# Patient Record
Sex: MALE | Race: WHITE | Employment: OTHER | URBAN - METROPOLITAN AREA
[De-identification: names, ages, dates, MRNs, and addresses within clinical notes are randomized per-mention and may not be internally consistent; named-entity substitution may affect disease eponyms.]

---

## 2022-12-05 ENCOUNTER — HOSPITAL ENCOUNTER (INPATIENT)
Age: 52
LOS: 5 days | Discharge: HOME | DRG: 177 | End: 2022-12-10
Attending: EMERGENCY MEDICINE | Admitting: INTERNAL MEDICINE
Payer: OTHER GOVERNMENT

## 2022-12-05 ENCOUNTER — APPOINTMENT (OUTPATIENT)
Dept: GENERAL RADIOLOGY | Age: 52
DRG: 177 | End: 2022-12-05
Attending: EMERGENCY MEDICINE
Payer: OTHER GOVERNMENT

## 2022-12-05 DIAGNOSIS — N17.9 AKI (ACUTE KIDNEY INJURY) (HCC): ICD-10-CM

## 2022-12-05 DIAGNOSIS — U07.1 COVID-19 VIRUS INFECTION: ICD-10-CM

## 2022-12-05 DIAGNOSIS — E13.10 DIABETIC KETOACIDOSIS WITHOUT COMA ASSOCIATED WITH OTHER SPECIFIED DIABETES MELLITUS (HCC): Primary | ICD-10-CM

## 2022-12-05 PROBLEM — E11.10 DKA (DIABETIC KETOACIDOSIS) (HCC): Status: ACTIVE | Noted: 2022-12-05

## 2022-12-05 LAB
ADMINISTERED INITIALS, ADMINIT: NORMAL
ALBUMIN SERPL-MCNC: 4.4 G/DL (ref 3.5–5.2)
ALBUMIN/GLOB SERPL: 1.1 {RATIO} (ref 1.1–2.2)
ALP SERPL-CCNC: 80 U/L (ref 40–129)
ALT SERPL-CCNC: 12 U/L (ref 10–50)
ANION GAP SERPL CALC-SCNC: 37 MMOL/L (ref 5–15)
APPEARANCE UR: CLEAR
AST SERPL-CCNC: 21 U/L (ref 10–50)
BACTERIA URNS QL MICRO: NEGATIVE /HPF
BASE DEFICIT BLD-SCNC: 17.6 MMOL/L
BASOPHILS # BLD: 0 K/UL (ref 0–0.1)
BASOPHILS NFR BLD: 0 % (ref 0–1)
BILIRUB SERPL-MCNC: 0.3 MG/DL (ref 0.2–1)
BILIRUB UR QL CFM: NEGATIVE
BUN SERPL-MCNC: 61 MG/DL (ref 6–20)
BUN/CREAT SERPL: 32 (ref 12–20)
CALCIUM SERPL-MCNC: 9 MG/DL (ref 8.6–10)
CHLORIDE SERPL-SCNC: 84 MMOL/L (ref 98–107)
CO2 SERPL-SCNC: 9 MMOL/L (ref 22–29)
COLOR UR: ABNORMAL
COVID-19 RAPID TEST, COVR: DETECTED
CREAT SERPL-MCNC: 1.93 MG/DL (ref 0.7–1.2)
D50 ADMINISTERED, D50ADM: 0 ML
D50 ORDER, D50ORD: 0 ML
DIFFERENTIAL METHOD BLD: ABNORMAL
EOSINOPHIL # BLD: 0 K/UL (ref 0–0.4)
EOSINOPHIL NFR BLD: 0 % (ref 0–7)
EPITH CASTS URNS QL MICRO: ABNORMAL /LPF
ERYTHROCYTE [DISTWIDTH] IN BLOOD BY AUTOMATED COUNT: 12.1 % (ref 11.5–14.5)
FLUAV AG NPH QL IA: NEGATIVE
FLUBV AG NOSE QL IA: NEGATIVE
GLOBULIN SER CALC-MCNC: 4 G/DL (ref 2–4)
GLUCOSE BLD STRIP.AUTO-MCNC: 532 MG/DL (ref 65–117)
GLUCOSE BLD STRIP.AUTO-MCNC: >600 MG/DL (ref 65–117)
GLUCOSE SERPL-MCNC: 608 MG/DL (ref 65–100)
GLUCOSE UR STRIP.AUTO-MCNC: >1000 MG/DL
GLUCOSE, GLC: 532 MG/DL
HCO3 BLD-SCNC: 8.6 MMOL/L (ref 22–26)
HCT VFR BLD AUTO: 52.1 % (ref 36.6–50.3)
HGB BLD-MCNC: 17.8 G/DL (ref 12.1–17)
HGB UR QL STRIP: ABNORMAL
HIGH TARGET, HITG: 200 MG/DL
IMM GRANULOCYTES # BLD AUTO: 0.1 K/UL (ref 0–0.04)
IMM GRANULOCYTES NFR BLD AUTO: 1 % (ref 0–0.5)
INSULIN ADMINSTERED, INSADM: 9.4 UNITS/HOUR
INSULIN ORDER, INSORD: 9.4 UNITS/HOUR
KETONES UR QL STRIP.AUTO: >80 MG/DL
LACTATE BLD-SCNC: 2.93 MMOL/L (ref 0.4–2)
LEUKOCYTE ESTERASE UR QL STRIP.AUTO: NEGATIVE
LOW TARGET, LOT: 150 MG/DL
LYMPHOCYTES # BLD: 1 K/UL (ref 0.8–3.5)
LYMPHOCYTES NFR BLD: 8 % (ref 12–49)
MAGNESIUM SERPL-MCNC: 2.9 MG/DL (ref 1.6–2.6)
MCH RBC QN AUTO: 29.8 PG (ref 26–34)
MCHC RBC AUTO-ENTMCNC: 34.2 G/DL (ref 30–36.5)
MCV RBC AUTO: 87.3 FL (ref 80–99)
MINUTES UNTIL NEXT BG, NBG: 60 MIN
MONOCYTES # BLD: 1.5 K/UL (ref 0–1)
MONOCYTES NFR BLD: 12 % (ref 5–13)
MULTIPLIER, MUL: 0.02
NEUTS SEG # BLD: 10 K/UL (ref 1.8–8)
NEUTS SEG NFR BLD: 80 % (ref 32–75)
NITRITE UR QL STRIP.AUTO: NEGATIVE
NRBC # BLD: 0 K/UL (ref 0–0.01)
NRBC BLD-RTO: 0 PER 100 WBC
ORDER INITIALS, ORDINIT: NORMAL
PCO2 BLD: 22.9 MMHG (ref 35–45)
PH BLD: 7.18 [PH] (ref 7.35–7.45)
PH UR STRIP: 5 [PH] (ref 5–8)
PHOSPHATE SERPL-MCNC: 5.4 MG/DL (ref 2.5–4.5)
PLATELET # BLD AUTO: 233 K/UL (ref 150–400)
PMV BLD AUTO: 10.3 FL (ref 8.9–12.9)
PO2 BLD: 40 MMHG (ref 80–100)
POTASSIUM SERPL-SCNC: 4.3 MMOL/L (ref 3.5–5.1)
PROT SERPL-MCNC: 8.4 G/DL (ref 6.4–8.3)
PROT UR STRIP-MCNC: ABNORMAL MG/DL
RBC # BLD AUTO: 5.97 M/UL (ref 4.1–5.7)
RBC #/AREA URNS HPF: ABNORMAL /HPF (ref 0–5)
SAO2 % BLD: 63.9 % (ref 92–97)
SERVICE CMNT-IMP: ABNORMAL
SODIUM SERPL-SCNC: 130 MMOL/L (ref 136–145)
SOURCE, COVRS: ABNORMAL
SP GR UR REFRACTOMETRY: 1.02 (ref 1–1.03)
SPECIMEN TYPE: ABNORMAL
UR CULT HOLD, URHOLD: NORMAL
UROBILINOGEN UR QL STRIP.AUTO: 0.2 EU/DL (ref 0.2–1)
WBC # BLD AUTO: 12.6 K/UL (ref 4.1–11.1)
WBC URNS QL MICRO: ABNORMAL /HPF (ref 0–4)

## 2022-12-05 PROCEDURE — 87635 SARS-COV-2 COVID-19 AMP PRB: CPT

## 2022-12-05 PROCEDURE — 65270000029 HC RM PRIVATE

## 2022-12-05 PROCEDURE — 74011000258 HC RX REV CODE- 258: Performed by: EMERGENCY MEDICINE

## 2022-12-05 PROCEDURE — 96360 HYDRATION IV INFUSION INIT: CPT

## 2022-12-05 PROCEDURE — 74011250636 HC RX REV CODE- 250/636: Performed by: EMERGENCY MEDICINE

## 2022-12-05 PROCEDURE — 71045 X-RAY EXAM CHEST 1 VIEW: CPT

## 2022-12-05 PROCEDURE — 84100 ASSAY OF PHOSPHORUS: CPT

## 2022-12-05 PROCEDURE — 81001 URINALYSIS AUTO W/SCOPE: CPT

## 2022-12-05 PROCEDURE — 80053 COMPREHEN METABOLIC PANEL: CPT

## 2022-12-05 PROCEDURE — 93005 ELECTROCARDIOGRAM TRACING: CPT

## 2022-12-05 PROCEDURE — 36415 COLL VENOUS BLD VENIPUNCTURE: CPT

## 2022-12-05 PROCEDURE — 99285 EMERGENCY DEPT VISIT HI MDM: CPT

## 2022-12-05 PROCEDURE — 87804 INFLUENZA ASSAY W/OPTIC: CPT

## 2022-12-05 PROCEDURE — 87040 BLOOD CULTURE FOR BACTERIA: CPT

## 2022-12-05 PROCEDURE — 85025 COMPLETE CBC W/AUTO DIFF WBC: CPT

## 2022-12-05 PROCEDURE — 83036 HEMOGLOBIN GLYCOSYLATED A1C: CPT

## 2022-12-05 PROCEDURE — 83735 ASSAY OF MAGNESIUM: CPT

## 2022-12-05 PROCEDURE — 74011636637 HC RX REV CODE- 636/637: Performed by: EMERGENCY MEDICINE

## 2022-12-05 RX ORDER — DEXTROSE MONOHYDRATE 100 MG/ML
0-250 INJECTION, SOLUTION INTRAVENOUS AS NEEDED
Status: DISCONTINUED | OUTPATIENT
Start: 2022-12-05 | End: 2022-12-07

## 2022-12-05 RX ORDER — MAGNESIUM SULFATE 100 %
4 CRYSTALS MISCELLANEOUS AS NEEDED
Status: DISCONTINUED | OUTPATIENT
Start: 2022-12-05 | End: 2022-12-07

## 2022-12-05 RX ORDER — METFORMIN HYDROCHLORIDE 1000 MG/1
1000 TABLET ORAL 2 TIMES DAILY WITH MEALS
Status: ON HOLD | COMMUNITY
End: 2022-12-07

## 2022-12-05 RX ORDER — POLYETHYLENE GLYCOL 3350 17 G/17G
17 POWDER, FOR SOLUTION ORAL DAILY PRN
Status: DISCONTINUED | OUTPATIENT
Start: 2022-12-05 | End: 2022-12-10 | Stop reason: HOSPADM

## 2022-12-05 RX ORDER — ACETAMINOPHEN 325 MG/1
650 TABLET ORAL
Status: DISCONTINUED | OUTPATIENT
Start: 2022-12-05 | End: 2022-12-10 | Stop reason: HOSPADM

## 2022-12-05 RX ORDER — SODIUM CHLORIDE 0.9 % (FLUSH) 0.9 %
5-40 SYRINGE (ML) INJECTION AS NEEDED
Status: DISCONTINUED | OUTPATIENT
Start: 2022-12-05 | End: 2022-12-10 | Stop reason: HOSPADM

## 2022-12-05 RX ORDER — SODIUM BICARBONATE 1 MEQ/ML
100 SYRINGE (ML) INTRAVENOUS ONCE
Status: DISCONTINUED | OUTPATIENT
Start: 2022-12-05 | End: 2022-12-06

## 2022-12-05 RX ORDER — SODIUM CHLORIDE 0.9 % (FLUSH) 0.9 %
5-40 SYRINGE (ML) INJECTION EVERY 8 HOURS
Status: DISCONTINUED | OUTPATIENT
Start: 2022-12-05 | End: 2022-12-10 | Stop reason: HOSPADM

## 2022-12-05 RX ORDER — ACETAMINOPHEN 650 MG/1
650 SUPPOSITORY RECTAL
Status: DISCONTINUED | OUTPATIENT
Start: 2022-12-05 | End: 2022-12-10 | Stop reason: HOSPADM

## 2022-12-05 RX ORDER — ONDANSETRON 2 MG/ML
4 INJECTION INTRAMUSCULAR; INTRAVENOUS
Status: DISCONTINUED | OUTPATIENT
Start: 2022-12-05 | End: 2022-12-10 | Stop reason: HOSPADM

## 2022-12-05 RX ORDER — SODIUM CHLORIDE 0.9 % (FLUSH) 0.9 %
5-10 SYRINGE (ML) INJECTION AS NEEDED
Status: DISCONTINUED | OUTPATIENT
Start: 2022-12-05 | End: 2022-12-10 | Stop reason: HOSPADM

## 2022-12-05 RX ORDER — ENOXAPARIN SODIUM 100 MG/ML
40 INJECTION SUBCUTANEOUS DAILY
Status: DISCONTINUED | OUTPATIENT
Start: 2022-12-06 | End: 2022-12-10 | Stop reason: HOSPADM

## 2022-12-05 RX ORDER — POTASSIUM CHLORIDE 7.45 MG/ML
10 INJECTION INTRAVENOUS
Status: DISCONTINUED | OUTPATIENT
Start: 2022-12-05 | End: 2022-12-06

## 2022-12-05 RX ORDER — SODIUM CHLORIDE, SODIUM LACTATE, POTASSIUM CHLORIDE, CALCIUM CHLORIDE 600; 310; 30; 20 MG/100ML; MG/100ML; MG/100ML; MG/100ML
200 INJECTION, SOLUTION INTRAVENOUS CONTINUOUS
Status: DISCONTINUED | OUTPATIENT
Start: 2022-12-05 | End: 2022-12-06

## 2022-12-05 RX ORDER — INSULIN LISPRO 100 [IU]/ML
INJECTION, SOLUTION INTRAVENOUS; SUBCUTANEOUS
Status: DISCONTINUED | OUTPATIENT
Start: 2022-12-06 | End: 2022-12-07

## 2022-12-05 RX ORDER — ONDANSETRON 4 MG/1
4 TABLET, ORALLY DISINTEGRATING ORAL
Status: DISCONTINUED | OUTPATIENT
Start: 2022-12-05 | End: 2022-12-10 | Stop reason: HOSPADM

## 2022-12-05 RX ADMIN — SODIUM CHLORIDE, POTASSIUM CHLORIDE, SODIUM LACTATE AND CALCIUM CHLORIDE 1000 ML: 600; 310; 30; 20 INJECTION, SOLUTION INTRAVENOUS at 22:23

## 2022-12-05 RX ADMIN — SODIUM CHLORIDE, POTASSIUM CHLORIDE, SODIUM LACTATE AND CALCIUM CHLORIDE 1000 ML: 600; 310; 30; 20 INJECTION, SOLUTION INTRAVENOUS at 21:14

## 2022-12-05 RX ADMIN — POTASSIUM CHLORIDE 10 MEQ: 7.46 INJECTION, SOLUTION INTRAVENOUS at 23:10

## 2022-12-05 RX ADMIN — Medication 9.4 UNITS/HR: at 22:53

## 2022-12-05 RX ADMIN — SODIUM CHLORIDE, POTASSIUM CHLORIDE, SODIUM LACTATE AND CALCIUM CHLORIDE 760 ML: 600; 310; 30; 20 INJECTION, SOLUTION INTRAVENOUS at 23:09

## 2022-12-06 LAB
ADMINISTERED INITIALS, ADMINIT: AC
ADMINISTERED INITIALS, ADMINIT: NORMAL
ALBUMIN SERPL-MCNC: 3.2 G/DL (ref 3.5–5)
ALBUMIN/GLOB SERPL: 0.8 {RATIO} (ref 1.1–2.2)
ALP SERPL-CCNC: 58 U/L (ref 45–117)
ALT SERPL-CCNC: 17 U/L (ref 12–78)
ANION GAP SERPL CALC-SCNC: 12 MMOL/L (ref 5–15)
ANION GAP SERPL CALC-SCNC: 14 MMOL/L (ref 5–15)
ANION GAP SERPL CALC-SCNC: 16 MMOL/L (ref 5–15)
ANION GAP SERPL CALC-SCNC: 17 MMOL/L (ref 5–15)
AST SERPL-CCNC: 19 U/L (ref 15–37)
BASOPHILS # BLD: 0 K/UL (ref 0–0.1)
BASOPHILS NFR BLD: 0 % (ref 0–1)
BILIRUB SERPL-MCNC: 0.4 MG/DL (ref 0.2–1)
BUN SERPL-MCNC: 31 MG/DL (ref 6–20)
BUN SERPL-MCNC: 34 MG/DL (ref 6–20)
BUN SERPL-MCNC: 39 MG/DL (ref 6–20)
BUN SERPL-MCNC: 46 MG/DL (ref 6–20)
BUN/CREAT SERPL: 29 (ref 12–20)
BUN/CREAT SERPL: 30 (ref 12–20)
BUN/CREAT SERPL: 31 (ref 12–20)
BUN/CREAT SERPL: 36 (ref 12–20)
CALCIUM SERPL-MCNC: 7.8 MG/DL (ref 8.5–10.1)
CALCIUM SERPL-MCNC: 8.3 MG/DL (ref 8.5–10.1)
CALCIUM SERPL-MCNC: 8.7 MG/DL (ref 8.5–10.1)
CALCIUM SERPL-MCNC: 8.7 MG/DL (ref 8.5–10.1)
CHLORIDE SERPL-SCNC: 105 MMOL/L (ref 97–108)
CHLORIDE SERPL-SCNC: 108 MMOL/L (ref 97–108)
CHLORIDE SERPL-SCNC: 111 MMOL/L (ref 97–108)
CHLORIDE SERPL-SCNC: 112 MMOL/L (ref 97–108)
CO2 SERPL-SCNC: 15 MMOL/L (ref 21–32)
CO2 SERPL-SCNC: 15 MMOL/L (ref 21–32)
CO2 SERPL-SCNC: 17 MMOL/L (ref 21–32)
CO2 SERPL-SCNC: 18 MMOL/L (ref 21–32)
COMMENT, HOLDF: NORMAL
CREAT SERPL-MCNC: 0.87 MG/DL (ref 0.7–1.3)
CREAT SERPL-MCNC: 1.11 MG/DL (ref 0.7–1.3)
CREAT SERPL-MCNC: 1.28 MG/DL (ref 0.7–1.3)
CREAT SERPL-MCNC: 1.6 MG/DL (ref 0.7–1.3)
D50 ADMINISTERED, D50ADM: 0 ML
D50 ORDER, D50ORD: 0 ML
DIFFERENTIAL METHOD BLD: ABNORMAL
EOSINOPHIL # BLD: 0 K/UL (ref 0–0.4)
EOSINOPHIL NFR BLD: 0 % (ref 0–7)
ERYTHROCYTE [DISTWIDTH] IN BLOOD BY AUTOMATED COUNT: 12.1 % (ref 11.5–14.5)
EST. AVERAGE GLUCOSE BLD GHB EST-MCNC: 237 MG/DL
GLOBULIN SER CALC-MCNC: 3.9 G/DL (ref 2–4)
GLUCOSE BLD STRIP.AUTO-MCNC: 122 MG/DL (ref 65–117)
GLUCOSE BLD STRIP.AUTO-MCNC: 125 MG/DL (ref 65–117)
GLUCOSE BLD STRIP.AUTO-MCNC: 140 MG/DL (ref 65–117)
GLUCOSE BLD STRIP.AUTO-MCNC: 150 MG/DL (ref 65–117)
GLUCOSE BLD STRIP.AUTO-MCNC: 150 MG/DL (ref 65–117)
GLUCOSE BLD STRIP.AUTO-MCNC: 156 MG/DL (ref 65–117)
GLUCOSE BLD STRIP.AUTO-MCNC: 157 MG/DL (ref 65–117)
GLUCOSE BLD STRIP.AUTO-MCNC: 159 MG/DL (ref 65–117)
GLUCOSE BLD STRIP.AUTO-MCNC: 164 MG/DL (ref 65–117)
GLUCOSE BLD STRIP.AUTO-MCNC: 178 MG/DL (ref 65–117)
GLUCOSE BLD STRIP.AUTO-MCNC: 184 MG/DL (ref 65–117)
GLUCOSE BLD STRIP.AUTO-MCNC: 185 MG/DL (ref 65–117)
GLUCOSE BLD STRIP.AUTO-MCNC: 192 MG/DL (ref 65–117)
GLUCOSE BLD STRIP.AUTO-MCNC: 193 MG/DL (ref 65–117)
GLUCOSE BLD STRIP.AUTO-MCNC: 202 MG/DL (ref 65–117)
GLUCOSE BLD STRIP.AUTO-MCNC: 207 MG/DL (ref 65–117)
GLUCOSE BLD STRIP.AUTO-MCNC: 222 MG/DL (ref 65–117)
GLUCOSE BLD STRIP.AUTO-MCNC: 225 MG/DL (ref 65–117)
GLUCOSE BLD STRIP.AUTO-MCNC: 269 MG/DL (ref 65–117)
GLUCOSE BLD STRIP.AUTO-MCNC: 269 MG/DL (ref 65–117)
GLUCOSE BLD STRIP.AUTO-MCNC: 298 MG/DL (ref 65–117)
GLUCOSE BLD STRIP.AUTO-MCNC: 391 MG/DL (ref 65–117)
GLUCOSE SERPL-MCNC: 138 MG/DL (ref 65–100)
GLUCOSE SERPL-MCNC: 170 MG/DL (ref 65–100)
GLUCOSE SERPL-MCNC: 170 MG/DL (ref 65–100)
GLUCOSE SERPL-MCNC: 188 MG/DL (ref 65–100)
GLUCOSE, GLC: 122 MG/DL
GLUCOSE, GLC: 125 MG/DL
GLUCOSE, GLC: 140 MG/DL
GLUCOSE, GLC: 150 MG/DL
GLUCOSE, GLC: 150 MG/DL
GLUCOSE, GLC: 156 MG/DL
GLUCOSE, GLC: 157 MG/DL
GLUCOSE, GLC: 159 MG/DL
GLUCOSE, GLC: 164 MG/DL
GLUCOSE, GLC: 178 MG/DL
GLUCOSE, GLC: 184 MG/DL
GLUCOSE, GLC: 185 MG/DL
GLUCOSE, GLC: 192 MG/DL
GLUCOSE, GLC: 193 MG/DL
GLUCOSE, GLC: 202 MG/DL
GLUCOSE, GLC: 207 MG/DL
GLUCOSE, GLC: 222 MG/DL
GLUCOSE, GLC: 225 MG/DL
GLUCOSE, GLC: 269 MG/DL
GLUCOSE, GLC: 298 MG/DL
GLUCOSE, GLC: 391 MG/DL
HBA1C MFR BLD: 9.9 % (ref 4–5.6)
HCT VFR BLD AUTO: 39.3 % (ref 36.6–50.3)
HGB BLD-MCNC: 13.8 G/DL (ref 12.1–17)
HIGH TARGET, HITG: 200 MG/DL
IMM GRANULOCYTES # BLD AUTO: 0 K/UL (ref 0–0.04)
IMM GRANULOCYTES NFR BLD AUTO: 0 % (ref 0–0.5)
INSULIN ADMINSTERED, INSADM: 10.5 UNITS/HOUR
INSULIN ADMINSTERED, INSADM: 2.8 UNITS/HOUR
INSULIN ADMINSTERED, INSADM: 3.3 UNITS/HOUR
INSULIN ADMINSTERED, INSADM: 3.8 UNITS/HOUR
INSULIN ADMINSTERED, INSADM: 3.9 UNITS/HOUR
INSULIN ADMINSTERED, INSADM: 4.2 UNITS/HOUR
INSULIN ADMINSTERED, INSADM: 4.3 UNITS/HOUR
INSULIN ADMINSTERED, INSADM: 5.1 UNITS/HOUR
INSULIN ADMINSTERED, INSADM: 5.3 UNITS/HOUR
INSULIN ADMINSTERED, INSADM: 5.7 UNITS/HOUR
INSULIN ADMINSTERED, INSADM: 5.8 UNITS/HOUR
INSULIN ADMINSTERED, INSADM: 6.1 UNITS/HOUR
INSULIN ADMINSTERED, INSADM: 6.5 UNITS/HOUR
INSULIN ADMINSTERED, INSADM: 6.6 UNITS/HOUR
INSULIN ADMINSTERED, INSADM: 7.1 UNITS/HOUR
INSULIN ADMINSTERED, INSADM: 7.1 UNITS/HOUR
INSULIN ADMINSTERED, INSADM: 7.5 UNITS/HOUR
INSULIN ADMINSTERED, INSADM: 7.9 UNITS/HOUR
INSULIN ADMINSTERED, INSADM: 8.8 UNITS/HOUR
INSULIN ADMINSTERED, INSADM: 9.6 UNITS/HOUR
INSULIN ADMINSTERED, INSADM: 9.7 UNITS/HOUR
INSULIN ORDER, INSORD: 10.5 UNITS/HOUR
INSULIN ORDER, INSORD: 2.8 UNITS/HOUR
INSULIN ORDER, INSORD: 3.3 UNITS/HOUR
INSULIN ORDER, INSORD: 3.8 UNITS/HOUR
INSULIN ORDER, INSORD: 3.9 UNITS/HOUR
INSULIN ORDER, INSORD: 4.2 UNITS/HOUR
INSULIN ORDER, INSORD: 4.3 UNITS/HOUR
INSULIN ORDER, INSORD: 5.1 UNITS/HOUR
INSULIN ORDER, INSORD: 5.3 UNITS/HOUR
INSULIN ORDER, INSORD: 5.7 UNITS/HOUR
INSULIN ORDER, INSORD: 5.8 UNITS/HOUR
INSULIN ORDER, INSORD: 6.1 UNITS/HOUR
INSULIN ORDER, INSORD: 6.5 UNITS/HOUR
INSULIN ORDER, INSORD: 6.6 UNITS/HOUR
INSULIN ORDER, INSORD: 7.1 UNITS/HOUR
INSULIN ORDER, INSORD: 7.1 UNITS/HOUR
INSULIN ORDER, INSORD: 7.5 UNITS/HOUR
INSULIN ORDER, INSORD: 7.9 UNITS/HOUR
INSULIN ORDER, INSORD: 8.8 UNITS/HOUR
INSULIN ORDER, INSORD: 9.6 UNITS/HOUR
INSULIN ORDER, INSORD: 9.7 UNITS/HOUR
LACTATE BLD-SCNC: 2.58 MMOL/L (ref 0.4–2)
LOW TARGET, LOT: 150 MG/DL
LYMPHOCYTES # BLD: 0.7 K/UL (ref 0.8–3.5)
LYMPHOCYTES NFR BLD: 8 % (ref 12–49)
MAGNESIUM SERPL-MCNC: 2.1 MG/DL (ref 1.6–2.4)
MAGNESIUM SERPL-MCNC: 2.2 MG/DL (ref 1.6–2.4)
MAGNESIUM SERPL-MCNC: 2.4 MG/DL (ref 1.6–2.4)
MAGNESIUM SERPL-MCNC: 2.6 MG/DL (ref 1.6–2.4)
MCH RBC QN AUTO: 30.1 PG (ref 26–34)
MCHC RBC AUTO-ENTMCNC: 35.1 G/DL (ref 30–36.5)
MCV RBC AUTO: 85.6 FL (ref 80–99)
MINUTES UNTIL NEXT BG, NBG: 120 MIN
MINUTES UNTIL NEXT BG, NBG: 60 MIN
MONOCYTES # BLD: 0.9 K/UL (ref 0–1)
MONOCYTES NFR BLD: 11 % (ref 5–13)
MULTIPLIER, MUL: 0.02
MULTIPLIER, MUL: 0.03
MULTIPLIER, MUL: 0.04
MULTIPLIER, MUL: 0.05
MULTIPLIER, MUL: 0.06
MULTIPLIER, MUL: 0.07
NEUTS SEG # BLD: 6.7 K/UL (ref 1.8–8)
NEUTS SEG NFR BLD: 81 % (ref 32–75)
NRBC # BLD: 0 K/UL (ref 0–0.01)
NRBC BLD-RTO: 0 PER 100 WBC
ORDER INITIALS, ORDINIT: AC
ORDER INITIALS, ORDINIT: NORMAL
PHOSPHATE SERPL-MCNC: 1.1 MG/DL (ref 2.6–4.7)
PLATELET # BLD AUTO: 163 K/UL (ref 150–400)
PMV BLD AUTO: 10 FL (ref 8.9–12.9)
POTASSIUM SERPL-SCNC: 3.5 MMOL/L (ref 3.5–5.1)
POTASSIUM SERPL-SCNC: 3.5 MMOL/L (ref 3.5–5.1)
POTASSIUM SERPL-SCNC: 3.6 MMOL/L (ref 3.5–5.1)
POTASSIUM SERPL-SCNC: 5.1 MMOL/L (ref 3.5–5.1)
PROCALCITONIN SERPL-MCNC: 0.1 NG/ML
PROT SERPL-MCNC: 7.1 G/DL (ref 6.4–8.2)
RBC # BLD AUTO: 4.59 M/UL (ref 4.1–5.7)
RBC MORPH BLD: ABNORMAL
SAMPLES BEING HELD,HOLD: NORMAL
SERVICE CMNT-IMP: ABNORMAL
SODIUM SERPL-SCNC: 138 MMOL/L (ref 136–145)
SODIUM SERPL-SCNC: 140 MMOL/L (ref 136–145)
SODIUM SERPL-SCNC: 141 MMOL/L (ref 136–145)
SODIUM SERPL-SCNC: 141 MMOL/L (ref 136–145)
WBC # BLD AUTO: 8.3 K/UL (ref 4.1–11.1)

## 2022-12-06 PROCEDURE — 65610000006 HC RM INTENSIVE CARE

## 2022-12-06 PROCEDURE — 80053 COMPREHEN METABOLIC PANEL: CPT

## 2022-12-06 PROCEDURE — 36415 COLL VENOUS BLD VENIPUNCTURE: CPT

## 2022-12-06 PROCEDURE — 74011000258 HC RX REV CODE- 258: Performed by: EMERGENCY MEDICINE

## 2022-12-06 PROCEDURE — 77030037877 HC DRSG MEPILEX >48IN BORD MOLN -A

## 2022-12-06 PROCEDURE — 74011250636 HC RX REV CODE- 250/636: Performed by: EMERGENCY MEDICINE

## 2022-12-06 PROCEDURE — 2709999900 HC NON-CHARGEABLE SUPPLY

## 2022-12-06 PROCEDURE — 84145 PROCALCITONIN (PCT): CPT

## 2022-12-06 PROCEDURE — 80048 BASIC METABOLIC PNL TOTAL CA: CPT

## 2022-12-06 PROCEDURE — 93005 ELECTROCARDIOGRAM TRACING: CPT

## 2022-12-06 PROCEDURE — 74011250636 HC RX REV CODE- 250/636: Performed by: INTERNAL MEDICINE

## 2022-12-06 PROCEDURE — 74011000250 HC RX REV CODE- 250: Performed by: INTERNAL MEDICINE

## 2022-12-06 PROCEDURE — 82962 GLUCOSE BLOOD TEST: CPT

## 2022-12-06 PROCEDURE — 74011636637 HC RX REV CODE- 636/637: Performed by: EMERGENCY MEDICINE

## 2022-12-06 PROCEDURE — 99233 SBSQ HOSP IP/OBS HIGH 50: CPT

## 2022-12-06 PROCEDURE — 85025 COMPLETE CBC W/AUTO DIFF WBC: CPT

## 2022-12-06 PROCEDURE — 83735 ASSAY OF MAGNESIUM: CPT

## 2022-12-06 PROCEDURE — 84100 ASSAY OF PHOSPHORUS: CPT

## 2022-12-06 RX ORDER — DILTIAZEM HYDROCHLORIDE 5 MG/ML
10 INJECTION INTRAVENOUS ONCE
Status: COMPLETED | OUTPATIENT
Start: 2022-12-07 | End: 2022-12-06

## 2022-12-06 RX ORDER — DILTIAZEM HYDROCHLORIDE 5 MG/ML
20 INJECTION INTRAVENOUS ONCE
Status: COMPLETED | OUTPATIENT
Start: 2022-12-06 | End: 2022-12-06

## 2022-12-06 RX ORDER — DEXTROSE, SODIUM CHLORIDE, AND POTASSIUM CHLORIDE 5; .45; .15 G/100ML; G/100ML; G/100ML
125 INJECTION INTRAVENOUS CONTINUOUS
Status: DISCONTINUED | OUTPATIENT
Start: 2022-12-06 | End: 2022-12-07

## 2022-12-06 RX ADMIN — Medication 6.1 UNITS/HR: at 12:36

## 2022-12-06 RX ADMIN — DILTIAZEM HYDROCHLORIDE 20 MG: 5 INJECTION INTRAVENOUS at 22:52

## 2022-12-06 RX ADMIN — Medication 10 ML: at 21:08

## 2022-12-06 RX ADMIN — ENOXAPARIN SODIUM 40 MG: 100 INJECTION SUBCUTANEOUS at 11:17

## 2022-12-06 RX ADMIN — Medication 7.5 UNITS/HR: at 07:55

## 2022-12-06 RX ADMIN — SODIUM BICARBONATE: 84 INJECTION, SOLUTION INTRAVENOUS at 21:00

## 2022-12-06 RX ADMIN — SODIUM CHLORIDE, POTASSIUM CHLORIDE, SODIUM LACTATE AND CALCIUM CHLORIDE 200 ML/HR: 600; 310; 30; 20 INJECTION, SOLUTION INTRAVENOUS at 00:31

## 2022-12-06 RX ADMIN — POTASSIUM CHLORIDE 10 MEQ: 7.46 INJECTION, SOLUTION INTRAVENOUS at 01:37

## 2022-12-06 RX ADMIN — DEXTROSE MONOHYDRATE, SODIUM CHLORIDE, AND POTASSIUM CHLORIDE 125 ML/HR: 50; 4.5; 1.49 INJECTION, SOLUTION INTRAVENOUS at 02:39

## 2022-12-06 RX ADMIN — Medication 10 ML: at 05:38

## 2022-12-06 RX ADMIN — ONDANSETRON 4 MG: 2 INJECTION INTRAMUSCULAR; INTRAVENOUS at 22:18

## 2022-12-06 RX ADMIN — DEXTROSE MONOHYDRATE, SODIUM CHLORIDE, AND POTASSIUM CHLORIDE 125 ML/HR: 50; 4.5; 1.49 INJECTION, SOLUTION INTRAVENOUS at 20:22

## 2022-12-06 RX ADMIN — DEXTROSE MONOHYDRATE, SODIUM CHLORIDE, AND POTASSIUM CHLORIDE 125 ML/HR: 50; 4.5; 1.49 INJECTION, SOLUTION INTRAVENOUS at 11:17

## 2022-12-06 RX ADMIN — Medication 3.3 UNITS/HR: at 16:08

## 2022-12-06 RX ADMIN — DILTIAZEM HYDROCHLORIDE 10 MG: 5 INJECTION INTRAVENOUS at 23:45

## 2022-12-06 RX ADMIN — POTASSIUM PHOSPHATE, MONOBASIC POTASSIUM PHOSPHATE, DIBASIC: 224; 236 INJECTION, SOLUTION, CONCENTRATE INTRAVENOUS at 13:57

## 2022-12-06 NOTE — ED TRIAGE NOTES
Pt reports feeling unwell for 2-3 days, developed cough today, has not been taking his insulin due to illness, blood sugar too high to read.  Denies chest pain, sob,

## 2022-12-06 NOTE — DIABETES MGMT
[address] [facility]    CLINICAL NURSE SPECIALIST CONSULT     Initial Presentation   Ericak Sabillon is a 46 y.o. male admitted on 12/05/22 after experiencing hyperglycemia and extreme fatigue, as well as some nausea. LAB: , AG 37, CO2 9, eGFR 41, Na 130, WBC 12.6, positive urine ketones  Chest Xray:IMPRESSION  Mild basilar atelectasis. Covid positive       HX:   Past Medical History:   Diagnosis Date    Diabetes ([facility] [address]     Hyperlipidemia     Hypertension         INITIAL DX:   DKA (diabetic ketoacidosis) ([facility] [place] [address] [E11.10]     Current Treatment     TX: IVF, Julious Radon, DVT prophylaxis    Consulted by Provider for advanced diabetes nursing assessment and care for:   [x] Transitioning off Julious Radon   [x] Inpatient management strategy  [x] Home management assessment  [] Survival skill education    Hospital Course   Clinical progress has been complicated by DKA. Diabetes History   Per chart encounter notes, patient has 25 year history of diabetes. Diabetes-related Medical History  Acute complications  DKA  Neurological complications  Microvascular disease  Retinopathy/ ARABELLA  Macrovascular disease  CAD, TIAs    Diabetes Medication History  Key Antihyperglycemic Medications               metFORMIN (GLUCOPHAGE) 1,000 mg tablet (Taking) Take 1,000 mg by mouth two (2) times daily (with meals). Diabetes self-management practices: Patient lethargic and has been confused, according to nurse. Eating pattern   Deferred at this time  Physical activity pattern   Deferred  Monitoring pattern   Deferred at this time  Taking medications pattern  Deferred at this time.  Reported by nurse that he was not taking his insulin before coming in due to feeling sick  Social determinants of health impacting diabetes self-management practices   Concerned that you need to know more about how to stay healthy with diabetes  Overall evaluation:    [x] Achieving A1c target with drug therapy & self-care practices: Awaiting A1c result    Subjective   Sleeping in bed - Covid isolation in ICU     Objective   Physical exam  General Obese male  ill-appearing. Conversant and cooperative  Neuro  Alert, oriented   Vital Signs Visit Vitals  BP (!) 164/94 (BP 1 Location: Right upper arm, BP Patient Position: At rest)   Pulse (!) 108   Temp 99.6 °F (37.6 °C)   Resp 28   Wt 93.6 kg (206 lb 5.6 oz)   SpO2 94%     Skin  Warm and dry.   Extremities No foot wounds    Diabetic foot exam:    Deferred    Laboratory  Recent Labs     12/06/22  0525 12/05/22  2106   * 608*   AGAP 16* 37*   WBC  --  12.6*   CREA 1.60* 1.93*   AST  --  21   ALT  --  12       Factors impacting BG management  Factor Dose Comments   Nutrition:  Standard meals     NPO per DKA protocol    Other:   Kidney function  Liver function  Hyperlipidemia   ARABELLA  Normal      Blood glucose pattern      Significant diabetes-related events over the past 24-72 hours  Admission   Remains on glucostabilizer - no AG closure yet    Assessment and Plan   Nursing Diagnosis Risk for unstable blood glucose pattern   Nursing Intervention Domain 5250 Decision-making Support   Nursing Interventions Examined current inpatient diabetes/blood glucose control   Explored factors facilitating and impeding inpatient management  Explored corrective strategies with patient and responsible inpatient provider   Informed patient of rational for insulin strategy while hospitalized     Nursing Diagnosis 95298 Ineffective Health Management   Nursing Intervention Domain 5250 Decision-makingSupport   Nursing Interventions Identified diabetes self-management practices impeding diabetes control  Discussed diabetes survival skills related to  Healthy Plate eating plan; given handouts  Role of physical activity in improving insulin sensitivity and action  Procedure for blood glucose monitoring & options for low-cost products available from [facility]   Medications plan at discharge Evaluation   This 46 y.o. male was admitted in DKA and is positive for Covid. Per chart, he reportedly is Type 1.5 and has had diabetes for about 25 years. Patient is sleepy and has been somewhat agitated and confused this morning in the ICU, so I was unable to interview him. It was reported by nurse that because he was feeling sick at home, he stopped taking his insulin, resulting in DKA. Still awaiting on current A1c result and for AG closure. Given his obesity and Covid infection, would recommend starting him off with a 0.3 unit/kg/D dose to transition him off GS. This patient would benefit from diabetes self-management education and support [facility]) after discharge.     Recommendations   Continue with DKA protocol until AG closed for 2 consecutive lab draws    [x] Use of Subcutaneous Insulin Order set (7348)  Insulin Dosing Specific recommendation   Basal                                      (Based on weight, BMI & GFR) []        0.2 units/kg/D  [x] 0.3 units/kg/D  [] 0.4 units/kg/D 28 units Lantus daily   Nutritional                                      (Based on CHO/dextrose load) [] Normal sensitivity  [x] Insulin-resistant sensitivity Will add once patient eating regularly   Corrective                                       (Useful in adjusting insulin dosing) [] Normal sensitivity  [] HIGH sensitivity  [x] Insulin-resistant sensitivity        Billing Code(s)   [x] 60313 IP subsequent hospital care - 35 minutes [] 60236 Prolonged Services - 65 minutes [] 68193 Prolonged Services - 110 minutes  [] 22652 IP subsequent hospital care - 25 minutes [] 23399 Prolonged Services - 55 minutes [] 59536 Prolonged Services - 100 minutes  [] 00874 IP subsequent hospital care - 15 minutes [] 34689 Prolonged Services - 45 minutes [] 04161 Prolonged Services - 90 minutes    Before making these care recommendations, I personally reviewed the hospitalization record, including notes, laboratory & diagnostic data and current medications, and examined the patient at the bedside (circumstances permitting) before making care recommendations. More than fifty (50) percent of the time was spent in patient counseling and/or care coordination.   Total minutes: NAM [address], CNS  Diabetes Clinical Nurse Specialist  [facility]  Access via [facility]

## 2022-12-06 NOTE — PROGRESS NOTES
Reason for Admission:  DKA                     RUR Score:       10%              Plan for utilizing home health:      no needs identified @ this time    PCP: First and Last name:  None     Name of Practice: will need new PCP set up   Are you a current patient: Yes/No:    Approximate date of last visit:    Can you participate in a virtual visit with your PCP:                     Current Advanced Directive/Advance Care Plan: Full Code      Healthcare Decision Maker:            Rossy Dubose [phone]                  Transition of Care Plan:                Admitted with:  DKA  Covid +         Diabetes CNS consulted. When pt became ill @ home,pt stopped taking his insulin resulting in DKA. I am following pt for future discharge needs. At this time,there are no home health,rehab or other needs identified.     Kirit Keane

## 2022-12-06 NOTE — H&P
Hospitalist Admission Note    NAME:  Armaan Rg   :  1970   MRN:  [id number]     Date/Time:  2022 5:08 AM    Patient PCP: None  ________________________________________________________________________    Given the patient's current clinical presentation, I have a high level of concern for decompensation if discharged from the emergency department. Complex decision making was performed, which includes reviewing the patient's available past medical records, laboratory results, and x-ray films. My assessment of this patient's clinical condition and my plan of care is as follows. Assessment / Plan:    DKA:    The patient comes in w/ DKA    VS - , RR 32,   WBC 12.6, Hg 17.8  CO2 9, BUN 61, Cr 1.93    Admit and cont to monitor in ICU  Cont w/ insulin drip w/ BMP q4h and BG q1h  Once BG <250 start D5 1/2NS +K. Once AG closes and if he is able to tolerate orals we can then transition to subcutaneous insulin    Most recent   Cont w/ insulin drip  Cont w/ D5 1/2 NS + K and follow up repeat BMP to re-evaluate bicarb and AG    2.  COVID:    Cont w/ conservative medical treatment for now      There is no height or weight on file to calculate BMI.:   No height in system      I have personally reviewed the radiographs, laboratory data in Epic and decisions and statements above are based partially on this personal interpretation. Code Status: Full Code     Prophylaxis:  Lovenox SQ    Critical Care time 30 minutes     Subjective:   CHIEF COMPLAINT: Nausea and vomiting    HISTORY OF PRESENT ILLNESS:       The patient is a 47 y/o CM w/ PMH type 1 DM on subcutaneous insulin who comes in w/ symptoms of nausea and vomiting that has been on-going for the past few days. He has at least 5 episode of bilious vomiting. He denies any abdominal pain, constipation or diarrhea. He has no dysuria or hematuria. He has no fever or night sweats.   On ROS he has chills, blurry vision, dizziness and a non productive cough. He has had recent sick contacts w/ family members during Thanksgiving w/ COVID 23. He has not had his influenza or COVID 19 vaccine. On routine BG checks his values >500. Past Medical History:   Diagnosis Date    Diabetes ([address]     Hyperlipidemia     Hypertension       No past surgical history on file. Social History     Tobacco Use    Smoking status: Never    Smokeless tobacco: Never   Substance Use Topics    Alcohol use: Never      FH:    Reviewed and patient denies any FH HTN, HLD, Dm2 in M/F    No Known Allergies     Prior to Admission medications    Medication Sig Start Date End Date Taking? Authorizing Provider   metFORMIN (GLUCOPHAGE) 1,000 mg tablet Take 1,000 mg by mouth two (2) times daily (with meals).    Yes Other, MD Kalyani       REVIEW OF SYSTEMS:  See HPI for details  General: negative for fever, +chills, sweats, weakness, weight loss  Eyes: +blurred vision, eye pain, loss of vision, diplopia  Ear Nose and Throat: negative for rhinorrhea, pharyngitis, otalgia, tinnitus, speech or swallowing difficulties  Respiratory:  negative for pleuritic pain, cough, sputum production, wheezing, SOB, MARAVILLA  Cardiology:  negative for chest pain, palpitations, orthopnea, PND, edema, syncope   Gastrointestinal: negative for abdominal pain, +N/+V, dysphagia, change in bowel habits, bleeding  Genitourinary: negative for frequency, urgency, dysuria, hematuria, incontinence  Muskuloskeletal : negative for arthralgia, myalgia  Hematology: negative for easy bruising, bleeding, lymphadenopathy  Dermatological: negative for rash, ulceration, mole change, new lesion  Endocrine: negative for hot flashes or polydipsia  Neurological: negative for headache, +dizziness, confusion, focal weakness, paresthesia, memory loss, gait disturbance  Psychological: negative for anxiety, depression, agitation      Objective:   VITALS:    Visit Vitals  BP (!) 154/91 (BP 1 Location: Left upper arm, BP Patient Position: At rest)   Pulse (!) 110   Temp 98.8 °F (37.1 °C)   Resp (!) 32   Wt 93.6 kg (206 lb 5.6 oz)   SpO2 96%     PHYSICAL EXAM:    Physical Exam:    Gen: Well-developed, well-nourished, in no acute distress  HEENT:  Pink conjunctivae, PERRL, hearing intact to voice, moist mucous membranes  Neck: Supple, without masses, thyroid non-tender  Resp: No accessory muscle use, clear breath sounds without wheezes rales or rhonchi  Card: No murmurs, normal S1, S2 without thrills, bruits or peripheral edema  Abd:  Soft, non-tender, non-distended, normoactive bowel sounds are present, no palpable organomegaly and no detectable hernias  Lymph:  No cervical or inguinal adenopathy  Musc: No cyanosis or clubbing  Skin: No rashes or ulcers, skin turgor is good  Neuro:  Cranial nerves are grossly intact, no focal motor weakness, follows commands appropriately  Psych:  oriented to person, place and time, alert  _______________________________________________________________________  Care Plan discussed with:  Pt's condition, Imaging findings, Lab findings, Assessment, and Care Plan discussed with: Patient  _______________________________________________________________________    Probable disposition:  Home  ________________________________________________________________________    Comments   >50% of visit spent in counseling and coordination of care  Chart reviewed  Discussion with patient and/or family and questions answered     ________________________________________________________________________  Signed: Jeferson Trevizo MD        Procedures: see electronic medical records for all procedures/Xrays and details which were not copied into this note but were reviewed prior to creation of Plan.     LAB DATA REVIEWED:    Recent Results (from the past 24 hour(s))   GLUCOSE, POC    Collection Time: 12/05/22  8:49 PM   Result Value Ref Range    Glucose (POC) >600 (HH) 65 - 117 mg/dL    Performed by Pedrito BROWN WITH AUTOMATED DIFF Collection Time: 12/05/22  9:06 PM   Result Value Ref Range    WBC 12.6 (H) 4.1 - 11.1 K/uL    RBC 5.97 (H) 4.10 - 5.70 M/uL    HGB 17.8 (H) 12.1 - 17.0 g/dL    HCT 52.1 (H) 36.6 - 50.3 %    MCV 87.3 80.0 - 99.0 FL    MCH 29.8 26.0 - 34.0 PG    MCHC 34.2 30.0 - 36.5 g/dL    RDW 12.1 11.5 - 14.5 %    PLATELET 448 242 - 387 K/uL    MPV 10.3 8.9 - 12.9 FL    NRBC 0.0 0  WBC    ABSOLUTE NRBC 0.00 0.00 - 0.01 K/uL    NEUTROPHILS 80 (H) 32 - 75 %    LYMPHOCYTES 8 (L) 12 - 49 %    MONOCYTES 12 5 - 13 %    EOSINOPHILS 0 0 - 7 %    BASOPHILS 0 0 - 1 %    IMMATURE GRANULOCYTES 1 (H) 0.0 - 0.5 %    ABS. NEUTROPHILS 10.0 (H) 1.8 - 8.0 K/UL    ABS. LYMPHOCYTES 1.0 0.8 - 3.5 K/UL    ABS. MONOCYTES 1.5 (H) 0.0 - 1.0 K/UL    ABS. EOSINOPHILS 0.0 0.0 - 0.4 K/UL    ABS. BASOPHILS 0.0 0.0 - 0.1 K/UL    ABS. IMM. GRANS. 0.1 (H) 0.00 - 0.04 K/UL    DF AUTOMATED     METABOLIC PANEL, COMPREHENSIVE    Collection Time: 12/05/22  9:06 PM   Result Value Ref Range    Sodium 130 (L) 136 - 145 mmol/L    Potassium 4.3 3.5 - 5.1 mmol/L    Chloride 84 (L) 98 - 107 mmol/L    CO2 9 (LL) 22 - 29 mmol/L    Anion gap 37 (H) 5 - 15 mmol/L    Glucose 608 (HH) 65 - 100 mg/dL    BUN 61 (H) 6 - 20 MG/DL    Creatinine 1.93 (H) 0.70 - 1.20 MG/DL    BUN/Creatinine ratio 32 (H) 12 - 20      eGFR 41 (L) >60 ml/min/1.73m2    Calcium 9.0 8.6 - 10.0 MG/DL    Bilirubin, total 0.3 0.2 - 1.0 MG/DL    ALT (SGPT) 12 10 - 50 U/L    AST (SGOT) 21 10 - 50 U/L    Alk.  phosphatase 80 40 - 129 U/L    Protein, total 8.4 (H) 6.4 - 8.3 g/dL    Albumin 4.4 3.5 - 5.2 g/dL    Globulin 4.0 2.0 - 4.0 g/dL    A-G Ratio 1.1 1.1 - 2.2     MAGNESIUM    Collection Time: 12/05/22  9:06 PM   Result Value Ref Range    Magnesium 2.9 (H) 1.6 - 2.6 mg/dL   URINALYSIS W/MICROSCOPIC    Collection Time: 12/05/22  9:06 PM   Result Value Ref Range    Color YELLOW/STRAW      Appearance CLEAR CLEAR      Specific gravity 1.020 1.003 - 1.030      pH (UA) 5.0 5.0 - 8.0      Protein TRACE (A) NEG mg/dL    Glucose >1,000 (A) NEG mg/dL    Ketone >80 (A) NEG mg/dL    Blood SMALL (A) NEG      Urobilinogen 0.2 0.2 - 1.0 EU/dL    Nitrites Negative NEG      Leukocyte Esterase Negative NEG      WBC 0-4 0 - 4 /hpf    RBC 0-5 0 - 5 /hpf    Epithelial cells FEW FEW /lpf    Bacteria Negative NEG /hpf   URINE CULTURE HOLD SAMPLE    Collection Time: 12/05/22  9:06 PM    Specimen: Urine   Result Value Ref Range    Urine culture hold        Urine on hold in Microbiology dept for 2 days. If unpreserved urine is submitted, it cannot be used for addtional testing after 24 hours, recollection will be required.    COVID-19 RAPID TEST    Collection Time: 12/05/22  9:06 PM   Result Value Ref Range    Specimen source Nasopharyngeal      COVID-19 rapid test Detected (AA) NOTD     INFLUENZA A+B VIRAL AGS    Collection Time: 12/05/22  9:06 PM   Result Value Ref Range    Influenza A Antigen Negative NEG      Influenza B Antigen Negative NEG     BILIRUBIN, CONFIRM    Collection Time: 12/05/22  9:06 PM   Result Value Ref Range    Bilirubin UA, confirm Negative NEG     PHOSPHORUS    Collection Time: 12/05/22  9:06 PM   Result Value Ref Range    Phosphorus 5.4 (H) 2.5 - 4.5 MG/DL   BLOOD GAS,LACTIC ACID, POC    Collection Time: 12/05/22  9:08 PM   Result Value Ref Range    pH (POC) 7.18 (LL) 7.35 - 7.45      pCO2 (POC) 22.9 (L) 35.0 - 45.0 MMHG    pO2 (POC) 40 (LL) 80 - 100 MMHG    HCO3 (POC) 8.6 (L) 22 - 26 MMOL/L    sO2 (POC) 63.9 (L) 92 - 97 %    Base deficit (POC) 17.6 mmol/L    Specimen type (POC) VENOUS BLOOD      Performed by Pedrito rBandt     Lactic Acid (POC) 2.93 (HH) 0.40 - 2.00 mmol/L   EKG, 12 LEAD, INITIAL    Collection Time: 12/05/22 10:34 PM   Result Value Ref Range    Ventricular Rate 114 BPM    Atrial Rate 114 BPM    P-R Interval 138 ms    QRS Duration 80 ms    Q-T Interval 330 ms    QTC Calculation (Bezet) 454 ms    Calculated P Axis 68 degrees    Calculated R Axis -45 degrees    Calculated T Axis 54 degrees Diagnosis       Sinus tachycardia  Possible Left atrial enlargement  Left axis deviation  Inferior infarct , age undetermined  Possible Anterior infarct , age undetermined  Abnormal ECG  No previous ECGs available     GLUCOSE, POC    Collection Time: 12/05/22 10:55 PM   Result Value Ref Range    Glucose (POC) 532 (H) 65 - 117 mg/dL    Performed by Artemio Rojas    Collection Time: 12/05/22 10:59 PM   Result Value Ref Range    Glucose 532 mg/dL    Insulin order 9.4 units/hour    Insulin adminstered 9.4 units/hour    Multiplier 0.020     Low target 150 mg/dL    High target 200 mg/dL    D50 order 0.0 ml    D50 administered 0.00 ml    Minutes until next BG 60 min    Order initials pm     Administered initials pm    GLUCOSE, POC    Collection Time: 12/06/22 12:06 AM   Result Value Ref Range    Glucose (POC) 391 (H) 65 - 117 mg/dL    Performed by Artemio Rojas    Collection Time: 12/06/22 12:10 AM   Result Value Ref Range    Glucose 391 mg/dL    Insulin order 6.6 units/hour    Insulin adminstered 6.6 units/hour    Multiplier 0.020     Low target 150 mg/dL    High target 200 mg/dL    D50 order 0.0 ml    D50 administered 0.00 ml    Minutes until next BG 60 min    Order initials smm     Administered initials smm    POC LACTIC ACID    Collection Time: 12/06/22 12:37 AM   Result Value Ref Range    Lactic Acid (POC) 2.58 (HH) 0.40 - 2.00 mmol/L   GLUCOSE, POC    Collection Time: 12/06/22  1:14 AM   Result Value Ref Range    Glucose (POC) 298 (H) 65 - 117 mg/dL    Performed by Artemio Rojas    Collection Time: 12/06/22  1:15 AM   Result Value Ref Range    Glucose 298 mg/dL    Insulin order 7.1 units/hour    Insulin adminstered 7.1 units/hour    Multiplier 0.030     Low target 150 mg/dL    High target 200 mg/dL    D50 order 0.0 ml    D50 administered 0.00 ml    Minutes until next BG 60 min    Order initials pm     Administered initials pm    GLUCOSE, POC    Collection Time: 12/06/22  2:01 AM   Result Value Ref Range    Glucose (POC) 269 (H) 65 - 117 mg/dL    Performed by Norwood Gottron    GLUCOSE, POC    Collection Time: 12/06/22  2:34 AM   Result Value Ref Range    Glucose (POC) 222 (H) 65 - 117 mg/dL    Performed by John Barlow    Collection Time: 12/06/22  2:36 AM   Result Value Ref Range    Glucose 222 mg/dL    Insulin order 6.5 units/hour    Insulin adminstered 6.5 units/hour    Multiplier 0.040     Low target 150 mg/dL    High target 200 mg/dL    D50 order 0.0 ml    D50 administered 0.00 ml    Minutes until next BG 60 min    Order initials smm     Administered initials smm    GLUCOSE, POC    Collection Time: 12/06/22  3:05 AM   Result Value Ref Range    Glucose (POC) 269 (H) 65 - 117 mg/dL    Performed by John Barlow    Collection Time: 12/06/22  3:18 AM   Result Value Ref Range    Glucose 269 mg/dL    Insulin order 10.5 units/hour    Insulin adminstered 10.5 units/hour    Multiplier 0.050     Low target 150 mg/dL    High target 200 mg/dL    D50 order 0.0 ml    D50 administered 0.00 ml    Minutes until next BG 60 min    Order initials smm     Administered initials smm    GLUCOSE, POC    Collection Time: 12/06/22  4:17 AM   Result Value Ref Range    Glucose (POC) 207 (H) 65 - 117 mg/dL    Performed by Yvonne Yee    Collection Time: 12/06/22  4:24 AM   Result Value Ref Range    Glucose 207 mg/dL    Insulin order 8.8 units/hour    Insulin adminstered 8.8 units/hour    Multiplier 0.060     Low target 150 mg/dL    High target 200 mg/dL    D50 order 0.0 ml    D50 administered 0.00 ml    Minutes until next BG 60 min    Order initials hr     Administered initials hr

## 2022-12-06 NOTE — CONSULTS
Consult History and Physical Exam    NAME:  Florentino Garcia   :   1970   MRN:  [id number]     PCP:  None   Referring: Kisha Oneill MD     Date/Time:  2022         Subjective:   REASON FOR CONSULT: DKA    CHIEF COMPLAINT: N/V     HISTORY OF PRESENT ILLNESS:     Mr. Elysa Koyanagi is a 46 y.o. male with a h/o DM2 who presented to the ED with a c/c of N/V for the past couple of days. He also complains of chills, blurry vision, dizziness, and a dry cough. Has had recent contact with COVID + individuals. In the ED, he was tachycardic, CO2 9, AG 37, glucose 608, Cr 1.93, WBC 12.6, UA with >80 ketones, and he was positive for COVID 19. He was given 2760 mL LR bolus and started on an insulin drip. He is being admitted to the ICU for further management. Past Medical History:   Diagnosis Date    Diabetes ([facility] [address]     Hyperlipidemia     Hypertension         No past surgical history on file. Social History     Tobacco Use    Smoking status: Never    Smokeless tobacco: Never   Substance Use Topics    Alcohol use: Never        No family history on file. No Known Allergies     Prior to Admission medications    Medication Sig Start Date End Date Taking? Authorizing Provider   metFORMIN (GLUCOPHAGE) 1,000 mg tablet Take 1,000 mg by mouth two (2) times daily (with meals). Yes Other, MD Kalyani         Review of Systems:  10 point ROS is negative except per HPI. Objective:      VITALS:    Vital signs reviewed; most recent are:    Visit Vitals  BP (!) 154/91 (BP 1 Location: Left upper arm, BP Patient Position: At rest)   Pulse (!) 110   Temp 98.8 °F (37.1 °C)   Resp (!) 32   Wt 93.6 kg (206 lb 5.6 oz)   SpO2 96%     SpO2 Readings from Last 6 Encounters:   22 96%          Intake/Output Summary (Last 24 hours) at 2022 0540  Last data filed at 2022 0417  Gross per 24 hour   Intake 254.93 ml   Output --   Net 254.93 ml            Exam:     Physical Exam:  Performed via video assessment.   Gen: Patient is alert in NAD  HEENT: NC/AT, EOMI  Chest: Chest movement is equal bilaterally  Cardiac: Cardiac monitor reveals ST  Extremities: Extremities appear well perfused with no obvious edema  Neuro: Nonfocal    Labs:    Recent Labs     12/05/22 2106   WBC 12.6*   HGB 17.8*   HCT 52.1*        Recent Labs     12/05/22 2106   *   K 4.3   CL 84*   CO2 9*   *   BUN 61*   CREA 1.93*   CA 9.0   MG 2.9*   PHOS 5.4*   ALB 4.4   ALT 12     No components found for: GLPOC    No results for input(s): INR, INREXT in the last 72 hours. Assessment/Plan:    DKA  HAGMA  ARABELLA  Leukocytosis-reactive  COVID 19    -Admit to ICU  -DKA protocol  -insulin drip  -D5 1/2 NS with 20 meq Kcl at 125/hr  -Lovenox ppx  -BMP q4 hours  -No need to treat COVID infection with steroids as he is on RA. No remdisivir or actemra  -q1 hour glucoses      Discussed:  Bedside RN    Prophylaxis:  Lovenox    Critical Care Time  The patient is critically ill with DKA. If I do not acutely intervene upon these illnesses, the patient's life is in danger. These illnesses have required me to: (1) perform high complexity decision making for assessment and support; (2) assess the patient via video; (3) personally review the medical record and laboratory and diagnostic imaging results; (4) actively titrate high-alert medications; (5) manage the ventilator and actively titrate oxygen; (6) discuss this patient's case management with other healthcare providers; and (7) apply and interpret advanced monitoring techniques. As a result of this, I personally spent 50 minutes providing critical care services exclusively to this patient.   This was in exclusion of the time spent performing procedures or teaching.           ___________________________________________________    Lee Hurtado, DO

## 2022-12-06 NOTE — PROGRESS NOTES
Bedside and Verbal shift change report given to   Noé Merritt RN (oncoming nurse) by Elizabet Shaw RN (offgoing nurse). Report included the following information SBAR, Kardex, Intake/Output, MAR, Recent Results, Cardiac Rhythm ST, and Alarm Parameters . Continued insulin drip, anion gap not closed on first lab draw. 1400 draw gap is closed. Will redraw again at 1800 and if closed insulin drip can stop. Pt continues to be lethargic. He has had a couple of incontinence episodes today. He is A+O, just some minor confusion. Anion gap closed on second lab at 1900, night shift going to contact MD about stopping insulin drip and starting diet. Bedside and Verbal shift change report given to BRITTANIE Cantu (oncoming nurse) by Noé Merritt RN (offgoing nurse). Report included the following information SBAR, Kardex, Intake/Output, MAR, Recent Results, Cardiac Rhythm ST, and Alarm Parameters .

## 2022-12-06 NOTE — ED PROVIDER NOTES
49-year-old male brought in by EMS with report of elevated blood sugars and reported fatigue and malaise for several days. Patient reports having some nasal congestion and some myalgias and feeling tired. Slightly nauseous and had not been eating and drinking as result patient had not been taking his insulin. Patient denies any specific fevers at home but has some chills. Denies any pain with urination. No abdominal pain. Some nausea without vomiting ever patient did have some intermittent episodes of diarrhea. Denies any blood or mucus in the diarrhea. Patient reports feeling slightly short of breath but has no chest pain. Patient reports he has history of diabetes 1.5  Denies any other significant medical problems or surgeries. Past medical history significant for diabetes on insulin  No significant past surgical history  No significant family history  Social History     Socioeconomic History    Marital status: Not on file     Spouse name: Not on file    Number of children: Not on file    Years of education: Not on file    Highest education level: Not on file   Occupational History    Not on file   Tobacco Use    Smoking status: Not on file    Smokeless tobacco: Not on file   Substance and Sexual Activity    Alcohol use: Not on file    Drug use: Not on file    Sexual activity: Not on file   Other Topics Concern    Not on file   Social History Narrative    Not on file     Social Determinants of Health     Financial Resource Strain: Not on file   Food Insecurity: Not on file   Transportation Needs: Not on file   Physical Activity: Not on file   Stress: Not on file   Social Connections: Not on file   Intimate Partner Violence: Not on file   Housing Stability: Not on file         ALLERGIES: Patient has no allergy information on record. Review of Systems   Constitutional:  Positive for activity change, appetite change, chills and fatigue. Negative for fever. HENT:  Positive for congestion. Negative for sore throat. Eyes:  Negative for pain. Respiratory:  Positive for shortness of breath. Cardiovascular:  Negative for chest pain. Gastrointestinal:  Positive for diarrhea and nausea. Negative for abdominal pain and vomiting. Endocrine: Positive for polyuria. Genitourinary:  Negative for dysuria and flank pain. Musculoskeletal:  Positive for myalgias. Negative for back pain and neck pain. Skin:  Negative for rash. Neurological:  Negative for dizziness and headaches. All other systems reviewed and are negative. Vitals:    12/05/22 2052   BP: (!) 188/97   Pulse: (!) 117   Resp: 30   Temp: 98.3 °F (36.8 °C)   Weight: 92 kg (202 lb 13.2 oz)            Physical Exam  Vitals and nursing note reviewed. Constitutional:       Appearance: He is well-developed. HENT:      Head: Normocephalic. Nose: Congestion present. Mouth/Throat:      Mouth: Mucous membranes are dry. Eyes:      Conjunctiva/sclera: Conjunctivae normal.   Cardiovascular:      Rate and Rhythm: Regular rhythm. Tachycardia present. Pulmonary:      Effort: Pulmonary effort is normal. Tachypnea present. No respiratory distress. Breath sounds: Normal breath sounds. Abdominal:      General: Bowel sounds are normal.      Palpations: Abdomen is soft. Tenderness: There is no abdominal tenderness. Musculoskeletal:         General: Normal range of motion. Cervical back: Normal range of motion and neck supple. Skin:     General: Skin is warm. Capillary Refill: Capillary refill takes less than 2 seconds. Findings: No rash. Neurological:      Mental Status: He is alert and oriented to person, place, and time.       Comments: No gross motor or sensory deficits        MDM  Number of Diagnoses or Management Options  ARABELLA (acute kidney injury) ([facility] [address]  COVID-19 virus infection  Diabetic ketoacidosis without coma associated with other specified diabetes mellitus ([facility] [address]  Diagnosis management comments: Patient found to be COVID-positive likely contributing patient's fatigue malaise and decreased appetite however he stopped taking his insulin. This led to patient having uncontrolled diabetes now having signs and symptoms consistent with DKA. Patient has anion gap metabolic acidosis. Given patient IV fluid bolus and started on insulin drip. Signs of acute kidney injury secondary to dehydration. Patient had SIRS criteria with elevated lactate. However symptoms consistent with viral syndrome. No antibiotics warranted at this time. Supportive measures and continue treatment for patient's DKA. Patient waiting EMS transport. Continuing to manage patient's DKA in [place] ED. Patient's blood sugar is dropping to 250 on insulin drip. At this time we will change patient's IV fluids to fluids containing dextrose D5 half-normal saline with 20 µK of potassium. We will continue insulin drip. We will be checking BMP to monitor patient's anion gap status. Continue insulin and fluids until patient's gap closes at which time patient can be transitioned onto subcutaneous insulin    Total critical care time (not including time spent performing separately reportable procedures): 75min       Amount and/or Complexity of Data Reviewed  Clinical lab tests: reviewed  Tests in the radiology section of CPT®: reviewed  Tests in the medicine section of CPT®: reviewed      ED Course as of 12/06/22 0234   Mon Dec 05, 2022   2221 COVID-19 rapid test(!!): Detected [ZD]   Tue Dec 06, 2022   0234 EKG sinus tachycardia rate of 114 with normal intervals, left axis deviation, no ST elevation or depressions. EKG interpreted by Delmar Cordoba MD   [ZD]      ED Course User Index  [ZD] Carolyne Hernandez MD       Procedures                   [facility] Consult for Admission  10:24 PM    ED Room Number: C05/C05  Patient Name and age:  Dave Mcelroy 46 y.o.  male  Working Diagnosis:   1.  Diabetic ketoacidosis without coma associated with other specified diabetes mellitus ([facility] [address]    2. COVID-19 virus infection        COVID-19 Suspicion:  yes  Sepsis present:  yes  Reassessment needed: no  Code Status:  Full Code  Readmission: no  Isolation Requirements:  yes  Recommended Level of Care:  ICU  Department:[facility]  Other:  insulin ggt    Recent Results (from the past 24 hour(s))   GLUCOSE, POC    Collection Time: 12/05/22  8:49 PM   Result Value Ref Range    Glucose (POC) >600 (HH) 65 - 117 mg/dL    Performed by Dominic Gottron    CBC WITH AUTOMATED DIFF    Collection Time: 12/05/22  9:06 PM   Result Value Ref Range    WBC 12.6 (H) 4.1 - 11.1 K/uL    RBC 5.97 (H) 4.10 - 5.70 M/uL    HGB 17.8 (H) 12.1 - 17.0 g/dL    HCT 52.1 (H) 36.6 - 50.3 %    MCV 87.3 80.0 - 99.0 FL    MCH 29.8 26.0 - 34.0 PG    MCHC 34.2 30.0 - 36.5 g/dL    RDW 12.1 11.5 - 14.5 %    PLATELET 961 770 - 561 K/uL    MPV 10.3 8.9 - 12.9 FL    NRBC 0.0 0  WBC    ABSOLUTE NRBC 0.00 0.00 - 0.01 K/uL    NEUTROPHILS 80 (H) 32 - 75 %    LYMPHOCYTES 8 (L) 12 - 49 %    MONOCYTES 12 5 - 13 %    EOSINOPHILS 0 0 - 7 %    BASOPHILS 0 0 - 1 %    IMMATURE GRANULOCYTES 1 (H) 0.0 - 0.5 %    ABS. NEUTROPHILS 10.0 (H) 1.8 - 8.0 K/UL    ABS. LYMPHOCYTES 1.0 0.8 - 3.5 K/UL    ABS. MONOCYTES 1.5 (H) 0.0 - 1.0 K/UL    ABS. EOSINOPHILS 0.0 0.0 - 0.4 K/UL    ABS. BASOPHILS 0.0 0.0 - 0.1 K/UL    ABS. IMM.  GRANS. 0.1 (H) 0.00 - 0.04 K/UL    DF AUTOMATED     METABOLIC PANEL, COMPREHENSIVE    Collection Time: 12/05/22  9:06 PM   Result Value Ref Range    Sodium 130 (L) 136 - 145 mmol/L    Potassium 4.3 3.5 - 5.1 mmol/L    Chloride 84 (L) 98 - 107 mmol/L    CO2 9 (LL) 22 - 29 mmol/L    Anion gap 37 (H) 5 - 15 mmol/L    Glucose 608 (HH) 65 - 100 mg/dL    BUN 61 (H) 6 - 20 MG/DL    Creatinine 1.93 (H) 0.70 - 1.20 MG/DL    BUN/Creatinine ratio 32 (H) 12 - 20      eGFR 41 (L) >60 ml/min/1.73m2    Calcium 9.0 8.6 - 10.0 MG/DL    Bilirubin, total 0.3 0.2 - 1.0 MG/DL    ALT (SGPT) 12 10 - 50 U/L AST (SGOT) 21 10 - 50 U/L    Alk. phosphatase 80 40 - 129 U/L    Protein, total 8.4 (H) 6.4 - 8.3 g/dL    Albumin 4.4 3.5 - 5.2 g/dL    Globulin 4.0 2.0 - 4.0 g/dL    A-G Ratio 1.1 1.1 - 2.2     MAGNESIUM    Collection Time: 12/05/22  9:06 PM   Result Value Ref Range    Magnesium 2.9 (H) 1.6 - 2.6 mg/dL   URINALYSIS W/MICROSCOPIC    Collection Time: 12/05/22  9:06 PM   Result Value Ref Range    Color YELLOW/STRAW      Appearance CLEAR CLEAR      Specific gravity 1.020 1.003 - 1.030      pH (UA) 5.0 5.0 - 8.0      Protein TRACE (A) NEG mg/dL    Glucose >1,000 (A) NEG mg/dL    Ketone >80 (A) NEG mg/dL    Blood SMALL (A) NEG      Urobilinogen 0.2 0.2 - 1.0 EU/dL    Nitrites Negative NEG      Leukocyte Esterase Negative NEG      WBC 0-4 0 - 4 /hpf    RBC 0-5 0 - 5 /hpf    Epithelial cells FEW FEW /lpf    Bacteria Negative NEG /hpf   URINE CULTURE HOLD SAMPLE    Collection Time: 12/05/22  9:06 PM    Specimen: Urine   Result Value Ref Range    Urine culture hold        Urine on hold in Microbiology dept for 2 days. If unpreserved urine is submitted, it cannot be used for addtional testing after 24 hours, recollection will be required.    COVID-19 RAPID TEST    Collection Time: 12/05/22  9:06 PM   Result Value Ref Range    Specimen source Nasopharyngeal      COVID-19 rapid test Detected (AA) NOTD     INFLUENZA A+B VIRAL AGS    Collection Time: 12/05/22  9:06 PM   Result Value Ref Range    Influenza A Antigen Negative NEG      Influenza B Antigen Negative NEG     BILIRUBIN, CONFIRM    Collection Time: 12/05/22  9:06 PM   Result Value Ref Range    Bilirubin UA, confirm Negative NEG     PHOSPHORUS    Collection Time: 12/05/22  9:06 PM   Result Value Ref Range    Phosphorus 5.4 (H) 2.5 - 4.5 MG/DL   BLOOD GAS,LACTIC ACID, POC    Collection Time: 12/05/22  9:08 PM   Result Value Ref Range    pH (POC) 7.18 (LL) 7.35 - 7.45      pCO2 (POC) 22.9 (L) 35.0 - 45.0 MMHG    pO2 (POC) 40 (LL) 80 - 100 MMHG    HCO3 (POC) 8.6 (L) 22 - 26 MMOL/L    sO2 (POC) 63.9 (L) 92 - 97 %    Base deficit (POC) 17.6 mmol/L    Specimen type (POC) VENOUS BLOOD      Performed by Pedrito Brandt     Lactic Acid (POC) 2.93 (HH) 0.40 - 2.00 mmol/L   EKG, 12 LEAD, INITIAL    Collection Time: 12/05/22 10:34 PM   Result Value Ref Range    Ventricular Rate 114 BPM    Atrial Rate 114 BPM    P-R Interval 138 ms    QRS Duration 80 ms    Q-T Interval 330 ms    QTC Calculation (Bezet) 454 ms    Calculated P Axis 68 degrees    Calculated R Axis -45 degrees    Calculated T Axis 54 degrees    Diagnosis       Sinus tachycardia  Possible Left atrial enlargement  Left axis deviation  Inferior infarct , age undetermined  Possible Anterior infarct , age undetermined  Abnormal ECG  No previous ECGs available     GLUCOSE, POC    Collection Time: 12/05/22 10:55 PM   Result Value Ref Range    Glucose (POC) 532 (H) 65 - 117 mg/dL    Performed by Bre Vega    Collection Time: 12/05/22 10:59 PM   Result Value Ref Range    Glucose 532 mg/dL    Insulin order 9.4 units/hour    Insulin adminstered 9.4 units/hour    Multiplier 0.020     Low target 150 mg/dL    High target 200 mg/dL    D50 order 0.0 ml    D50 administered 0.00 ml    Minutes until next BG 60 min    Order initials pm     Administered initials pm    GLUCOSE, POC    Collection Time: 12/06/22 12:06 AM   Result Value Ref Range    Glucose (POC) 391 (H) 65 - 117 mg/dL    Performed by Bre Vega    Collection Time: 12/06/22 12:10 AM   Result Value Ref Range    Glucose 391 mg/dL    Insulin order 6.6 units/hour    Insulin adminstered 6.6 units/hour    Multiplier 0.020     Low target 150 mg/dL    High target 200 mg/dL    D50 order 0.0 ml    D50 administered 0.00 ml    Minutes until next BG 60 min    Order initials smm     Administered initials smm        XR CHEST PORT    Result Date: 12/5/2022  EXAM: XR CHEST PORT DATE: 12/5/2022 9:28 PM INDICATION: Eval for Infiltrate COMPARISON: None. FINDINGS: AP portable chest radiograph. The heart size is normal. There are mild basilar opacities favoring atelectasis. No consolidation is seen. There is no evidence of effusion or pneumothorax. No displaced fracture is seen. Mild basilar atelectasis.

## 2022-12-06 NOTE — PROGRESS NOTES
TRANSFER - IN ATHBIT:2090    Verbal report received from Christina York RN(name) on Juan Cast  being received from [facility] ED(unit) for routine progression of care      Report consisted of patients Situation, Background, Assessment and   Recommendations(SBAR). Information from the following report(s) SBAR, Kardex, ED Summary, Intake/Output, and Recent Results was reviewed with the receiving nurse. Opportunity for questions and clarification was provided. Assessment completed upon patients arrival to unit and care assumed. Primary Nurse Sonali Verdugo RN and Ana Raphael RN performed a dual skin assessment on this patient - impairment noted - see wound doc  Dax score is see flowsheets    See flowsheets for all assessments, see MAR for all medication administrations. Bedside and Verbal shift change report given to RN (oncoming nurse) by Berta Celis RN (offgoing nurse). Report included the following information SBAR, Intake/Output, MAR, Recent Results, Cardiac Rhythm: and Alarm Parameters .

## 2022-12-06 NOTE — WOUND CARE
Wound Consult:  New Patient Visit. Consulted for: sacral rash POA. Patient resting on Rosie in Touch bed; hercules system in place. Dax score: 19.  Patient turns side to side in bed without assistance. Patient is alert; he endorses when asked that  he has eczema; in with patient's nurse Jaydon Chapman to assess. Assessment:  Lower gluteal cleft/inner buttocks - pink base with scattered dry plaques; no redness at sacrum itself; eczema in lower buttocks. No other areas; patient says he mostly just gets in this area. No redness to heels. Groin without rash. Treatment:  Sacral foam in use and reseated to protect area from friction with patient movement in bed. Wound Recommendations:  No actual wounds; area of eczema, can protect with foam; patient uses steroid ointment at baseline. Skin and PI Prevention Recommendations:  Ensure patient turning; repositioning. Manage moisture: intentional toileting. Continue to monitor risk assessment with Dax score; Dual skin assessment and changes in condition. Promote nutrition; consult if needed. Plan:  Discussed with patient's nurse Jaydon Chapman. Please re-consult should any concerns arise. Jim King RN,[facility]    Wound and Ostomy Department.   [phone] wound nurse or [facility]

## 2022-12-06 NOTE — ED NOTES
Increased insulin from 6.5 to 10.5 ml/hr for a BS of 269. Adjustments per glucose-stabilizer malena.

## 2022-12-07 ENCOUNTER — APPOINTMENT (OUTPATIENT)
Dept: GENERAL RADIOLOGY | Age: 52
DRG: 177 | End: 2022-12-07
Attending: INTERNAL MEDICINE
Payer: OTHER GOVERNMENT

## 2022-12-07 LAB
ADMINISTERED INITIALS, ADMINIT: NORMAL
ANION GAP SERPL CALC-SCNC: 11 MMOL/L (ref 5–15)
ANION GAP SERPL CALC-SCNC: 6 MMOL/L (ref 5–15)
ATRIAL RATE: 114 BPM
BASOPHILS # BLD: 0 K/UL (ref 0–0.1)
BASOPHILS NFR BLD: 0 % (ref 0–1)
BUN SERPL-MCNC: 20 MG/DL (ref 6–20)
BUN SERPL-MCNC: 21 MG/DL (ref 6–20)
BUN/CREAT SERPL: 21 (ref 12–20)
BUN/CREAT SERPL: 24 (ref 12–20)
CALCIUM SERPL-MCNC: 7.5 MG/DL (ref 8.5–10.1)
CALCIUM SERPL-MCNC: 8.4 MG/DL (ref 8.5–10.1)
CALCULATED P AXIS, ECG09: 68 DEGREES
CALCULATED R AXIS, ECG10: -45 DEGREES
CALCULATED T AXIS, ECG11: 54 DEGREES
CHLORIDE SERPL-SCNC: 103 MMOL/L (ref 97–108)
CHLORIDE SERPL-SCNC: 105 MMOL/L (ref 97–108)
CO2 SERPL-SCNC: 23 MMOL/L (ref 21–32)
CO2 SERPL-SCNC: 28 MMOL/L (ref 21–32)
CREAT SERPL-MCNC: 0.82 MG/DL (ref 0.7–1.3)
CREAT SERPL-MCNC: 0.99 MG/DL (ref 0.7–1.3)
D DIMER PPP FEU-MCNC: 4.09 MG/L FEU (ref 0–0.65)
D50 ADMINISTERED, D50ADM: 0 ML
D50 ORDER, D50ORD: 0 ML
DIAGNOSIS, 93000: NORMAL
DIFFERENTIAL METHOD BLD: ABNORMAL
EOSINOPHIL # BLD: 0 K/UL (ref 0–0.4)
EOSINOPHIL NFR BLD: 0 % (ref 0–7)
ERYTHROCYTE [DISTWIDTH] IN BLOOD BY AUTOMATED COUNT: 12.1 % (ref 11.5–14.5)
GLUCOSE BLD STRIP.AUTO-MCNC: 141 MG/DL (ref 65–117)
GLUCOSE BLD STRIP.AUTO-MCNC: 168 MG/DL (ref 65–117)
GLUCOSE BLD STRIP.AUTO-MCNC: 170 MG/DL (ref 65–117)
GLUCOSE BLD STRIP.AUTO-MCNC: 174 MG/DL (ref 65–117)
GLUCOSE BLD STRIP.AUTO-MCNC: 176 MG/DL (ref 65–117)
GLUCOSE BLD STRIP.AUTO-MCNC: 191 MG/DL (ref 65–117)
GLUCOSE BLD STRIP.AUTO-MCNC: 203 MG/DL (ref 65–117)
GLUCOSE BLD STRIP.AUTO-MCNC: 208 MG/DL (ref 65–117)
GLUCOSE BLD STRIP.AUTO-MCNC: 321 MG/DL (ref 65–117)
GLUCOSE SERPL-MCNC: 205 MG/DL (ref 65–100)
GLUCOSE SERPL-MCNC: 378 MG/DL (ref 65–100)
GLUCOSE, GLC: 141 MG/DL
GLUCOSE, GLC: 168 MG/DL
GLUCOSE, GLC: 170 MG/DL
GLUCOSE, GLC: 174 MG/DL
GLUCOSE, GLC: 176 MG/DL
HCT VFR BLD AUTO: 48.4 % (ref 36.6–50.3)
HGB BLD-MCNC: 17 G/DL (ref 12.1–17)
HIGH TARGET, HITG: 200 MG/DL
IMM GRANULOCYTES # BLD AUTO: 0.1 K/UL (ref 0–0.04)
IMM GRANULOCYTES NFR BLD AUTO: 1 % (ref 0–0.5)
INSULIN ADMINSTERED, INSADM: 2.7 UNITS/HOUR
INSULIN ADMINSTERED, INSADM: 3.6 UNITS/HOUR
INSULIN ADMINSTERED, INSADM: 3.6 UNITS/HOUR
INSULIN ADMINSTERED, INSADM: 3.8 UNITS/HOUR
INSULIN ADMINSTERED, INSADM: 4.9 UNITS/HOUR
INSULIN ORDER, INSORD: 2.7 UNITS/HOUR
INSULIN ORDER, INSORD: 3.6 UNITS/HOUR
INSULIN ORDER, INSORD: 3.6 UNITS/HOUR
INSULIN ORDER, INSORD: 3.8 UNITS/HOUR
INSULIN ORDER, INSORD: 4.9 UNITS/HOUR
LOW TARGET, LOT: 150 MG/DL
LYMPHOCYTES # BLD: 1 K/UL (ref 0.8–3.5)
LYMPHOCYTES NFR BLD: 13 % (ref 12–49)
MAGNESIUM SERPL-MCNC: 1.9 MG/DL (ref 1.6–2.4)
MAGNESIUM SERPL-MCNC: 2.1 MG/DL (ref 1.6–2.4)
MCH RBC QN AUTO: 29.5 PG (ref 26–34)
MCHC RBC AUTO-ENTMCNC: 35.1 G/DL (ref 30–36.5)
MCV RBC AUTO: 84 FL (ref 80–99)
MINUTES UNTIL NEXT BG, NBG: 120 MIN
MINUTES UNTIL NEXT BG, NBG: 60 MIN
MONOCYTES # BLD: 0.6 K/UL (ref 0–1)
MONOCYTES NFR BLD: 8 % (ref 5–13)
MULTIPLIER, MUL: 0.03
MULTIPLIER, MUL: 0.04
NEUTS SEG # BLD: 6 K/UL (ref 1.8–8)
NEUTS SEG NFR BLD: 78 % (ref 32–75)
NRBC # BLD: 0 K/UL (ref 0–0.01)
NRBC BLD-RTO: 0 PER 100 WBC
ORDER INITIALS, ORDINIT: NORMAL
P-R INTERVAL, ECG05: 138 MS
PLATELET # BLD AUTO: 177 K/UL (ref 150–400)
PMV BLD AUTO: 9.9 FL (ref 8.9–12.9)
POTASSIUM SERPL-SCNC: 3.2 MMOL/L (ref 3.5–5.1)
POTASSIUM SERPL-SCNC: 4.4 MMOL/L (ref 3.5–5.1)
Q-T INTERVAL, ECG07: 330 MS
QRS DURATION, ECG06: 80 MS
QTC CALCULATION (BEZET), ECG08: 454 MS
RBC # BLD AUTO: 5.76 M/UL (ref 4.1–5.7)
SERVICE CMNT-IMP: ABNORMAL
SODIUM SERPL-SCNC: 137 MMOL/L (ref 136–145)
SODIUM SERPL-SCNC: 139 MMOL/L (ref 136–145)
TSH SERPL DL<=0.05 MIU/L-ACNC: 0.5 UIU/ML (ref 0.36–3.74)
VENTRICULAR RATE, ECG03: 114 BPM
WBC # BLD AUTO: 7.7 K/UL (ref 4.1–11.1)

## 2022-12-07 PROCEDURE — 74011250637 HC RX REV CODE- 250/637: Performed by: INTERNAL MEDICINE

## 2022-12-07 PROCEDURE — 99223 1ST HOSP IP/OBS HIGH 75: CPT | Performed by: INTERNAL MEDICINE

## 2022-12-07 PROCEDURE — 83735 ASSAY OF MAGNESIUM: CPT

## 2022-12-07 PROCEDURE — 85025 COMPLETE CBC W/AUTO DIFF WBC: CPT

## 2022-12-07 PROCEDURE — 99232 SBSQ HOSP IP/OBS MODERATE 35: CPT

## 2022-12-07 PROCEDURE — 74011000250 HC RX REV CODE- 250: Performed by: INTERNAL MEDICINE

## 2022-12-07 PROCEDURE — 74011636637 HC RX REV CODE- 636/637: Performed by: INTERNAL MEDICINE

## 2022-12-07 PROCEDURE — 74011250636 HC RX REV CODE- 250/636: Performed by: EMERGENCY MEDICINE

## 2022-12-07 PROCEDURE — 74011250636 HC RX REV CODE- 250/636: Performed by: INTERNAL MEDICINE

## 2022-12-07 PROCEDURE — 82962 GLUCOSE BLOOD TEST: CPT

## 2022-12-07 PROCEDURE — 80048 BASIC METABOLIC PNL TOTAL CA: CPT

## 2022-12-07 PROCEDURE — APPSS30 APP SPLIT SHARED TIME 16-30 MINUTES: Performed by: NURSE PRACTITIONER

## 2022-12-07 PROCEDURE — 84443 ASSAY THYROID STIM HORMONE: CPT

## 2022-12-07 PROCEDURE — 65270000046 HC RM TELEMETRY

## 2022-12-07 PROCEDURE — 85379 FIBRIN DEGRADATION QUANT: CPT

## 2022-12-07 PROCEDURE — 36415 COLL VENOUS BLD VENIPUNCTURE: CPT

## 2022-12-07 PROCEDURE — 71045 X-RAY EXAM CHEST 1 VIEW: CPT

## 2022-12-07 RX ORDER — INSULIN GLARGINE 100 [IU]/ML
0.3 INJECTION, SOLUTION SUBCUTANEOUS DAILY
Status: DISCONTINUED | OUTPATIENT
Start: 2022-12-07 | End: 2022-12-09

## 2022-12-07 RX ORDER — METFORMIN HYDROCHLORIDE 1000 MG/1
1000 TABLET, FILM COATED, EXTENDED RELEASE ORAL
COMMUNITY

## 2022-12-07 RX ORDER — SIMVASTATIN 40 MG/1
20 TABLET, FILM COATED ORAL
COMMUNITY

## 2022-12-07 RX ORDER — AMLODIPINE BESYLATE 10 MG/1
5 TABLET ORAL DAILY
COMMUNITY
End: 2022-12-10

## 2022-12-07 RX ORDER — ASCORBIC ACID 500 MG
500 TABLET ORAL DAILY
Status: DISCONTINUED | OUTPATIENT
Start: 2022-12-08 | End: 2022-12-10 | Stop reason: HOSPADM

## 2022-12-07 RX ORDER — INSULIN GLARGINE 100 [IU]/ML
50 INJECTION, SOLUTION SUBCUTANEOUS
COMMUNITY

## 2022-12-07 RX ORDER — LABETALOL HCL 20 MG/4 ML
20 SYRINGE (ML) INTRAVENOUS
Status: DISCONTINUED | OUTPATIENT
Start: 2022-12-07 | End: 2022-12-10 | Stop reason: HOSPADM

## 2022-12-07 RX ORDER — MAGNESIUM SULFATE 100 %
4 CRYSTALS MISCELLANEOUS AS NEEDED
Status: DISCONTINUED | OUTPATIENT
Start: 2022-12-07 | End: 2022-12-10 | Stop reason: HOSPADM

## 2022-12-07 RX ORDER — SODIUM CHLORIDE, SODIUM LACTATE, POTASSIUM CHLORIDE, CALCIUM CHLORIDE 600; 310; 30; 20 MG/100ML; MG/100ML; MG/100ML; MG/100ML
50 INJECTION, SOLUTION INTRAVENOUS CONTINUOUS
Status: DISCONTINUED | OUTPATIENT
Start: 2022-12-07 | End: 2022-12-08

## 2022-12-07 RX ORDER — INSULIN LISPRO 100 [IU]/ML
INJECTION, SOLUTION INTRAVENOUS; SUBCUTANEOUS
Status: DISCONTINUED | OUTPATIENT
Start: 2022-12-07 | End: 2022-12-10 | Stop reason: HOSPADM

## 2022-12-07 RX ORDER — INSULIN ASPART 100 [IU]/ML
20-25 INJECTION, SOLUTION INTRAVENOUS; SUBCUTANEOUS
COMMUNITY

## 2022-12-07 RX ORDER — BENZONATATE 100 MG/1
100 CAPSULE ORAL
Status: DISCONTINUED | OUTPATIENT
Start: 2022-12-07 | End: 2022-12-10 | Stop reason: HOSPADM

## 2022-12-07 RX ORDER — DEXTROSE MONOHYDRATE 100 MG/ML
0-250 INJECTION, SOLUTION INTRAVENOUS AS NEEDED
Status: DISCONTINUED | OUTPATIENT
Start: 2022-12-07 | End: 2022-12-07

## 2022-12-07 RX ORDER — ZINC SULFATE 50(220)MG
1 CAPSULE ORAL DAILY
Status: DISCONTINUED | OUTPATIENT
Start: 2022-12-08 | End: 2022-12-10 | Stop reason: HOSPADM

## 2022-12-07 RX ORDER — CARVEDILOL 3.12 MG/1
3.12 TABLET ORAL 2 TIMES DAILY WITH MEALS
Status: DISCONTINUED | OUTPATIENT
Start: 2022-12-07 | End: 2022-12-07

## 2022-12-07 RX ORDER — LISINOPRIL 5 MG/1
5 TABLET ORAL DAILY
Status: DISCONTINUED | OUTPATIENT
Start: 2022-12-07 | End: 2022-12-10 | Stop reason: HOSPADM

## 2022-12-07 RX ORDER — DEXTROSE MONOHYDRATE 100 MG/ML
0-250 INJECTION, SOLUTION INTRAVENOUS AS NEEDED
Status: DISCONTINUED | OUTPATIENT
Start: 2022-12-07 | End: 2022-12-10 | Stop reason: HOSPADM

## 2022-12-07 RX ORDER — HYDRALAZINE HYDROCHLORIDE 20 MG/ML
10 INJECTION INTRAMUSCULAR; INTRAVENOUS
Status: DISCONTINUED | OUTPATIENT
Start: 2022-12-07 | End: 2022-12-07

## 2022-12-07 RX ORDER — AMLODIPINE BESYLATE 5 MG/1
5 TABLET ORAL DAILY
Status: DISCONTINUED | OUTPATIENT
Start: 2022-12-07 | End: 2022-12-07

## 2022-12-07 RX ORDER — POTASSIUM CHLORIDE 750 MG/1
20 TABLET, FILM COATED, EXTENDED RELEASE ORAL
Status: COMPLETED | OUTPATIENT
Start: 2022-12-07 | End: 2022-12-07

## 2022-12-07 RX ORDER — DILTIAZEM HYDROCHLORIDE 120 MG/1
120 CAPSULE, COATED, EXTENDED RELEASE ORAL DAILY
Status: DISCONTINUED | OUTPATIENT
Start: 2022-12-07 | End: 2022-12-10 | Stop reason: HOSPADM

## 2022-12-07 RX ORDER — SEMAGLUTIDE 1.34 MG/ML
1 INJECTION, SOLUTION SUBCUTANEOUS
COMMUNITY

## 2022-12-07 RX ORDER — INSULIN LISPRO 100 [IU]/ML
INJECTION, SOLUTION INTRAVENOUS; SUBCUTANEOUS
Status: DISCONTINUED | OUTPATIENT
Start: 2022-12-07 | End: 2022-12-07

## 2022-12-07 RX ADMIN — Medication 10 ML: at 21:45

## 2022-12-07 RX ADMIN — DILTIAZEM HYDROCHLORIDE 120 MG: 120 CAPSULE, COATED, EXTENDED RELEASE ORAL at 12:16

## 2022-12-07 RX ADMIN — Medication 4 UNITS: at 11:11

## 2022-12-07 RX ADMIN — Medication 10 UNITS: at 16:15

## 2022-12-07 RX ADMIN — CARVEDILOL 3.12 MG: 3.12 TABLET, FILM COATED ORAL at 09:04

## 2022-12-07 RX ADMIN — POTASSIUM CHLORIDE 20 MEQ: 750 TABLET, FILM COATED, EXTENDED RELEASE ORAL at 11:11

## 2022-12-07 RX ADMIN — SODIUM CHLORIDE, POTASSIUM CHLORIDE, SODIUM LACTATE AND CALCIUM CHLORIDE 100 ML/HR: 600; 310; 30; 20 INJECTION, SOLUTION INTRAVENOUS at 19:47

## 2022-12-07 RX ADMIN — Medication 2 UNITS: at 21:45

## 2022-12-07 RX ADMIN — Medication 10 ML: at 13:00

## 2022-12-07 RX ADMIN — LISINOPRIL 5 MG: 5 TABLET ORAL at 10:11

## 2022-12-07 RX ADMIN — Medication 10 ML: at 07:15

## 2022-12-07 RX ADMIN — INSULIN GLARGINE 28 UNITS: 100 INJECTION, SOLUTION SUBCUTANEOUS at 07:46

## 2022-12-07 RX ADMIN — AMLODIPINE BESYLATE 5 MG: 5 TABLET ORAL at 09:04

## 2022-12-07 RX ADMIN — POTASSIUM PHOSPHATE, MONOBASIC AND POTASSIUM PHOSPHATE, DIBASIC: 224; 236 INJECTION, SOLUTION, CONCENTRATE INTRAVENOUS at 12:16

## 2022-12-07 RX ADMIN — ENOXAPARIN SODIUM 40 MG: 100 INJECTION SUBCUTANEOUS at 09:04

## 2022-12-07 RX ADMIN — SODIUM CHLORIDE, POTASSIUM CHLORIDE, SODIUM LACTATE AND CALCIUM CHLORIDE 100 ML/HR: 600; 310; 30; 20 INJECTION, SOLUTION INTRAVENOUS at 10:11

## 2022-12-07 RX ADMIN — DEXTROSE MONOHYDRATE, SODIUM CHLORIDE, AND POTASSIUM CHLORIDE 125 ML/HR: 50; 4.5; 1.49 INJECTION, SOLUTION INTRAVENOUS at 04:49

## 2022-12-07 NOTE — PROGRESS NOTES
1900: Bedside and Verbal shift change report given to Trae RN (oncoming nurse) by BRITTANIE Medel (offgoing nurse). Report included the following information SBAR, Kardex, Intake/Output, MAR, Recent Results, Cardiac Rhythm Sinus tach, and Quality Measures. 2000: shift assessment completed; see flowsheet    2135: Dr. hTanh Wise notified of EKG showing SVT. Awaiting orders. 2238: Orders placed for Diltiazem 20mg IV push once. 2320: Verbal orders received from Dr. Thanh Wise to give another 10mg IVP Diltiazem once. 0000: Reassessment completed; see flowsheet    0423: Dr. Eveline Villalobos notified of BP of 170/96. Orders to continue to monitor. 0700: Bedside and Verbal shift change report given to Vicente Maguire RN (oncoming nurse) by Marline Jacobs RN (offgoing nurse). Report included the following information SBAR, Kardex, Intake/Output, MAR, Recent Results, Cardiac Rhythm sinus tachy, and Quality Measures.

## 2022-12-07 NOTE — PROGRESS NOTES
[facility]  Hospitalist Group                                                                                          Hospitalist Progress Note  Patti Arnold MD  Answering service: [phone] OR [phone] from in house phone        Date of Service:  2022  NAME:  Ericka Sabillon  :  1970  MRN:  [id number]      Admission Summary:   Patient is a pleasant 68-year-old gentleman with a past medical history of diabetes who presented to the emergency room due to nausea and vomiting and was admitted for DKA       Interval history / Subjective:   Patient remains in the ICU on an insulin drip. Drip weaned off today, subcu insulin sliding scale and long-acting started. Diabetic diet initiated. Patient was started on antihypertensives. Transfer to the telemetry floor     Assessment & Plan:     DKA  Metabolic acidosis  --Insulin drip was discontinued this morning 2 hours after Lantus  --Sliding scale insulin and basal insulin started  --Continue electrolyte repletion  --Anion gap resolved  --Continue to trend CMP daily  --Continue electrolyte repletion  --Discussed extensively with nursing and patient at bedside  --Critical care signed off, we appreciate their assistance    Hypertension  --Started on amlodipine and Coreg  --Vital signs per unit routine     COVID-19  --Not hypoxic  --Does not meet criteria for remdesivir or Paxlovid  -- No need for steroids at this time     Code status: Full  Prophylaxis: Heparin  Care Plan discussed with: Patient, RN  Anticipated Disposition: Home with [facility]    39 Min Critical care time     Hospital Problems  Never Reviewed            Codes Class Noted POA    DKA (diabetic ketoacidosis) ([facility] ICD-10-CM: E11.10  ICD-9-CM: 250.12  2022 Unknown           Review of Systems:   A comprehensive review of systems was negative except for that written in the HPI. Vital Signs:    Last 24hrs VS reviewed since prior progress note.  Most recent are:  Visit Vitals  BP (P) 126/77 (BP 1 Location: Right upper arm, BP Patient Position: At rest)   Pulse (!) 106   Temp 97.2 °F (36.2 °C)   Resp 27   Wt 93.2 kg (205 lb 7.5 oz)   SpO2 91%         Intake/Output Summary (Last 24 hours) at 12/7/2022 1729  Last data filed at 12/7/2022 1530  Gross per 24 hour   Intake 2966.44 ml   Output 750 ml   Net 2216.44 ml          Physical Examination:     I had a face to face encounter with this patient and independently examined them on 12/7/2022 as outlined below:          Constitutional:  No acute distress, cooperative, pleasant    ENT:  Oral mucosa moist, oropharynx benign. Resp:  CTA bilaterally. No wheezing/rhonchi/rales. No accessory muscle use. CV:  Regular rhythm, normal rate, no murmurs, gallops, rubs    GI:  Soft, non distended, non tender. normoactive bowel sounds, no hepatosplenomegaly     Musculoskeletal:  No edema, warm, 2+ pulses throughout    Neurologic:  Moves all extremities. AAOx3, CN II-XII reviewed            Data Review:    Review and/or order of clinical lab test  Review and/or order of tests in the radiology section of CPT  Review and/or order of tests in the medicine section of CPT  Discussion of test results with performing physician      Labs:     Recent Labs     12/07/22  0220 12/06/22  1006   WBC 7.7 8.3   HGB 17.0 13.8   HCT 48.4 39.3    163       Recent Labs     12/07/22  0756 12/07/22  0240 12/06/22  1807 12/06/22  1356 12/06/22  1006 12/06/22  0525 12/05/22  2106    137 141   < > 140   < > 130*   K 3.2* 4.4 5.1   < > 3.5   < > 4.3    103 112*   < > 108   < > 84*   CO2 23 28 15*   < > 15*   < > 9*   BUN 20 21* 31*   < > 39*   < > 61*   CREA 0.82 0.99 0.87   < > 1.28   < > 1.93*   * 378* 170*   < > 188*   < > 608*   CA 8.4* 7.5* 7.8*   < > 8.7   < > 9.0   MG 2.1 1.9 2.1   < > 2.4   < > 2.9*   PHOS  --   --   --   --  1.1*  --  5.4*    < > = values in this interval not displayed.        Recent Labs     12/06/22  1003 12/05/22 2106   ALT 17 12   AP 58 80   TBILI 0.4 0.3   TP 7.1 8.4*   ALB 3.2* 4.4   GLOB 3.9 4.0       No results for input(s): INR, PTP, APTT, INREXT, INREXT in the last 72 hours. No results for input(s): FE, TIBC, PSAT, FERR in the last 72 hours. No results found for: FOL, RBCF   No results for input(s): PH, PCO2, PO2 in the last 72 hours. No results for input(s): CPK, CKNDX, TROIQ in the last 72 hours.     No lab exists for component: CPKMB  No results found for: CHOL, CHOLX, CHLST, CHOLV, HDL, HDLP, LDL, LDLC, DLDLP, TGLX, TRIGL, TRIGP, CHHD, CHHDX  Lab Results   Component Value Date/Time    Glucose (POC) 321 (H) 12/07/2022 04:11 PM    Glucose (POC) 203 (H) 12/07/2022 11:04 AM    Glucose (POC) 191 (H) 12/07/2022 09:01 AM    Glucose (POC) 176 (H) 12/07/2022 07:15 AM    Glucose (POC) 168 (H) 12/07/2022 05:58 AM     Lab Results   Component Value Date/Time    Color YELLOW/STRAW 12/05/2022 09:06 PM    Appearance CLEAR 12/05/2022 09:06 PM    Specific gravity 1.020 12/05/2022 09:06 PM    pH (UA) 5.0 12/05/2022 09:06 PM    Protein TRACE (A) 12/05/2022 09:06 PM    Glucose >1,000 (A) 12/05/2022 09:06 PM    Ketone >80 (A) 12/05/2022 09:06 PM    Urobilinogen 0.2 12/05/2022 09:06 PM    Nitrites Negative 12/05/2022 09:06 PM    Leukocyte Esterase Negative 12/05/2022 09:06 PM    Epithelial cells FEW 12/05/2022 09:06 PM    Bacteria Negative 12/05/2022 09:06 PM    WBC 0-4 12/05/2022 09:06 PM    RBC 0-5 12/05/2022 09:06 PM         Medications Reviewed:     Current Facility-Administered Medications   Medication Dose Route Frequency    glucose chewable tablet 16 g  4 Tablet Oral PRN    glucagon (GLUCAGEN) injection 1 mg  1 mg IntraMUSCular PRN    insulin glargine (LANTUS) injection 28 Units  0.3 Units/kg SubCUTAneous DAILY    hydrALAZINE (APRESOLINE) 20 mg/mL injection 10 mg  10 mg IntraVENous Q6H PRN    lactated Ringers infusion  100 mL/hr IntraVENous CONTINUOUS    lisinopriL (PRINIVIL, ZESTRIL) tablet 5 mg  5 mg Oral DAILY insulin lispro (HUMALOG) injection   SubCUTAneous AC&HS    dextrose 10% infusion 0-250 mL  0-250 mL IntraVENous PRN    dilTIAZem ER (CARDIZEM CD) capsule 120 mg  120 mg Oral DAILY    sodium chloride (NS) flush 5-10 mL  5-10 mL IntraVENous PRN    sodium chloride (NS) flush 5-40 mL  5-40 mL IntraVENous Q8H    sodium chloride (NS) flush 5-40 mL  5-40 mL IntraVENous PRN    acetaminophen (TYLENOL) tablet 650 mg  650 mg Oral Q6H PRN    Or    acetaminophen (TYLENOL) suppository 650 mg  650 mg Rectal Q6H PRN    polyethylene glycol (MIRALAX) packet 17 g  17 g Oral DAILY PRN    ondansetron (ZOFRAN ODT) tablet 4 mg  4 mg Oral Q8H PRN    Or    ondansetron (ZOFRAN) injection 4 mg  4 mg IntraVENous Q6H PRN    enoxaparin (LOVENOX) injection 40 mg  40 mg SubCUTAneous DAILY     ______________________________________________________________________  EXPECTED LENGTH OF STAY: 3d 19h  ACTUAL LENGTH OF STAY:          2                 Kimberly Barbour MD

## 2022-12-07 NOTE — DIABETES MGMT
[address] [facility]    CLINICAL NURSE SPECIALIST CONSULT     Initial Presentation   Juan Cast is a 46 y.o. male admitted on 12/05/22 after experiencing hyperglycemia and extreme fatigue, as well as some nausea. LAB: , AG 37, CO2 9, eGFR 41, Na 130, WBC 12.6, positive urine ketones  Chest Xray:IMPRESSION  Mild basilar atelectasis. Covid positive       HX:   Past Medical History:   Diagnosis Date    Diabetes ([facility]     Hyperlipidemia     Hypertension         INITIAL DX:   DKA (diabetic ketoacidosis) ([facility] [E11.10]     Current Treatment     TX: IVF, Basal/bolus insulin, DVT prophylaxis, BP management    Consulted by Provider for advanced diabetes nursing assessment and care for:   [x] Transitioning off Huseyin Zambrano   [x] Inpatient management strategy  [x] Home management assessment  [] Survival skill education    Hospital Course   Clinical progress has been complicated by DKA. Diabetes History   Patient has 25 year history of diabetes. Was diagnosed \"in the 90s\" with labwork after having polyuria, polydipsia, and blurry vision. He states he was not hospitalized at that time. Was started on Metformin at that time and has been on ever since. Has also been taking Ozempic once a week, Lantus 50 units daily, Humalog 25 units with meals, and some kind of \"flozin\" (SGLT-2). Father and sister both have diabetes. Diabetes-related Medical History  Acute complications  DKA  Neurological complications  Peripheral neuropathy  Microvascular disease  Retinopathy  Macrovascular disease  CAD, TIAs    Diabetes Medication History  Key Antihyperglycemic Medications               metFORMIN (GLUCOPHAGE) 1,000 mg tablet Take 1,000 mg by mouth two (2) times daily (with meals).              Diabetes self-management practices:   Eating pattern   Deferred at this time  Physical activity pattern   Was trying to walk 3 miles/week  Monitoring pattern   States he checks before each meal; BG range 110-220  Taking medications pattern  See above  Social determinants of health impacting diabetes self-management practices   Concerned that you need to know more about how to stay healthy with diabetes  Overall evaluation:    [x] Achieving A1c target with drug therapy & self-care practices: Awaiting A1c result    Subjective   \" I have a pharmacological team at the [facility] that manages my diabetes med. \"     Objective   Physical exam  General Obese male ill-appearing and sleepy, but was able to talk  a bit  Neuro  Alert, oriented   Vital Signs Visit Vitals  BP (!) 168/105   Pulse (!) 104   Temp 99.1 °F (37.3 °C)   Resp (!) 38   Wt 93.2 kg (205 lb 7.5 oz)   SpO2 95%     Skin  Warm and dry. Extremities No foot wounds    Diabetic foot exam:    Deferred    Laboratory  Recent Labs     12/07/22  0756 12/07/22  0240 12/07/22  0220 12/06/22  1807 12/06/22  1356 12/06/22  1006 12/06/22  0525 12/05/22  2106   * 378*  --  170*   < > 188*   < > 608*   AGAP 11 6  --  14   < > 17*   < > 37*   WBC  --   --  7.7  --   --  8.3  --  12.6*   CREA 0.82 0.99  --  0.87   < > 1.28   < > 1.93*   AST  --   --   --   --   --  19  --  21   ALT  --   --   --   --   --  17  --  12    < > = values in this interval not displayed.          Factors impacting BG management  Factor Dose Comments   Nutrition:  Standard meals     60 gram/meals    Other:   Kidney function  Liver function  Hyperlipidemia   Normal  Normal      Blood glucose pattern      Significant diabetes-related events over the past 24-72 hours  Admission   AG closed this morning  Transitioned off GS with a 0.3 basal dose  Assessment and Plan   Nursing Diagnosis Risk for unstable blood glucose pattern   Nursing Intervention Domain 4524 Decision-making Support   Nursing Interventions Examined current inpatient diabetes/blood glucose control   Explored factors facilitating and impeding inpatient management  Explored corrective strategies with patient and responsible inpatient provider   Informed patient of rational for insulin strategy while hospitalized     Nursing Diagnosis 83818 Ineffective Health Management   Nursing Intervention Domain 12 Decision-makingSupport   Nursing Interventions Identified diabetes self-management practices impeding diabetes control  Discussed diabetes survival skills related to  Healthy Plate eating plan; given handouts  Role of physical activity in improving insulin sensitivity and action  Procedure for blood glucose monitoring & options for low-cost products [facility] from [facility]   Medications plan at discharge     Evaluation   This 46 y.o. male was admitted in DKA and is positive for Covid. Per chart, he reportedly is Type 1.5 and has had diabetes for about 25 years. He is followed by pharmacological and diabetes team at the [facility]. He states he has been taking his medications regularly, but states he got so sick recently, that he wasn't eating and  just couldn't function enough to take his insulin. This point, on top of cytokine release with Covid, sent him into DKA. He was transitioned off GS with a 0.3 unit/kg/D dose this morning and will begin eating. Given his usual home medication regiment, he will likely need a higher basal, but will watch trends today and add some mealtime Humalog. This patient would benefit from diabetes self-management education and support YAMILET Alexandre [facility]) after discharge.     Recommendations     [x] Use of Subcutaneous Insulin Order set (4777)  Insulin Dosing Specific recommendation   Basal                                      (Based on weight, BMI & GFR) []        0.2 units/kg/D  [x] 0.3 units/kg/D  [] 0.4 units/kg/D 28 units Lantus daily   Nutritional                                      (Based on CHO/dextrose load) [] Normal sensitivity  [x] Insulin-resistant sensitivity 6 units Humalog with each consumed meal   Corrective                                       (Useful in adjusting insulin dosing) [] Normal sensitivity  [] HIGH sensitivity  [x] Insulin-resistant sensitivity        Billing Code(s)   [] 91605 IP subsequent hospital care - 35 minutes [] 51767 Prolonged Services - 65 minutes [] 27233 Prolonged Services - 110 minutes  [x] 53177 IP subsequent hospital care - 25 minutes [] 65176 Prolonged Services - 55 minutes [] 99052 Prolonged Services - 100 minutes  [] 86546 IP subsequent hospital care - 15 minutes [] 07326 Prolonged Services - 45 minutes [] 35493 Prolonged Services - 90 minutes    Before making these care recommendations, I personally reviewed the hospitalization record, including notes, laboratory & diagnostic data and current medications, and examined the patient at the bedside (circumstances permitting) before making care recommendations. More than fifty (50) percent of the time was spent in patient counseling and/or care coordination.   Total minutes: 25    JOHANNE Fernandez  Diabetes Clinical Nurse Specialist  [facility]  Access via [employer]

## 2022-12-07 NOTE — PROGRESS NOTES
[facility]                    Cardiology Care Note     [x]Initial Encounter     []Follow-up    Patient Name: Alberto Ramires - ITB:[id number] - DJJ:[id number]  Primary Cardiologist: [facility] Cardiology Physicians: none   Consulting Cardiologist: [facility] Cardiology Physicians: Juanis Lyon MD     Reason for encounter: SVT     HPI:       Alberto Ramires is a 46 y.o. male with PMH significant for The patient is a 47 y/o CM w/ PMH type 1 DM on subcutaneous insulin who comes in w/ symptoms of nausea and vomiting that has been on-going for the past few days. He has at least 5 episode of bilious vomiting. He denies any abdominal pain, constipation or diarrhea. He has no dysuria or hematuria. He has no fever or night sweats. On ROS he has chills, blurry vision, dizziness and a non productive cough. He has had recent sick contacts w/ family members during Thanksgiving w/ COVID 23. He has not had his influenza or COVID 19 vaccine. On routine BG checks his values >500. SVT overnight (sinus) treated with diltiazem 20 then 10 mg, resolved after 45 minutes     Subjective:      Alberto Ramires reports none. Assessment and Plan     1 SVT occurred overnight for approx 45 minutes, terminated with diltiazem IV. Appears sinus in review of tele, no EKG obtained during event. Will check TSH, ECHO. Coreg has been added this am   2 DKA: per medicine team   3. DM: A1C 9.9   4: COVID +: supportive care. 5 HTN: norvasc, coreg started today         ____________________________________________________________    Cardiac testing         Most recent HS troponins:  No results for input(s): TROPHS in the last 72 hours.     No lab exists for component:  CKMB    ECG:   EKG Results       Procedure 720 Value Units Date/Time    EKG, 12 LEAD, INITIAL [[id number]] Collected: 12/05/22 2234    Order Status: Completed Updated: 12/05/22 2235     Ventricular Rate 114 BPM      Atrial Rate 114 BPM      P-R Interval 138 ms      QRS Duration 80 ms      Q-T Interval 330 ms      QTC Calculation (Bezet) 454 ms      Calculated P Axis 68 degrees      Calculated R Axis -45 degrees      Calculated T Axis 54 degrees      Diagnosis --     Sinus tachycardia  Possible Left atrial enlargement  Left axis deviation  Inferior infarct , age undetermined  Possible Anterior infarct , age undetermined  Abnormal ECG  No previous ECGs available                Review of Systems:    [x]All other systems reviewed and all negative except as written in HPI    [] Patient unable to provide secondary to condition    Past Medical History:   Diagnosis Date    Diabetes ([facility]     Hyperlipidemia     Hypertension      No past surgical history on file. Social Hx:  reports that he has never smoked. He has never used smokeless tobacco. He reports that he does not drink alcohol and does not use drugs. Family Hx: family history is not on file. No Known Allergies       OBJECTIVE:  Wt Readings from Last 3 Encounters:   12/07/22 93.2 kg (205 lb 7.5 oz)       Intake/Output Summary (Last 24 hours) at 12/7/2022 0811  Last data filed at 12/7/2022 0700  Gross per 24 hour   Intake 3969.46 ml   Output 550 ml   Net 3419.46 ml       Physical Exam:    Vitals:   Vitals:    12/07/22 0645 12/07/22 0700 12/07/22 0715 12/07/22 0749   BP: (!) 132/114 (!) 150/113 (!) 155/93    Pulse: (!) 109 (!) 109 (!) 106    Resp: (!) 35 28 (!) 33    Temp:       SpO2: 92% 94% 95%    Weight:    93.2 kg (205 lb 7.5 oz)     Telemetry: NSR     Gen: Well-developed, well-nourished, in no acute distress  Neck: Supple, No JVD, No Carotid Bruit  Resp: No accessory muscle use, Clear breath sounds, No rales or rhonchi  Card: Regular Rate,Rythm, Normal S1, S2, No murmurs, rubs or gallop. No thrills.    Abd:   Soft, non-tender, non-distended, BS+   MSK: No cyanosis  Skin: No rashes    Neuro: Moving all four extremities, follows commands appropriately  Psych: Good insight, oriented to person, place, alert, Nml Affect  LE: No edema    Data Review:     Radiology:   XR Results (most recent):  Results from Hospital Encounter encounter on 12/05/22    XR CHEST PORT    Narrative  EXAM: XR CHEST PORT    DATE: 12/5/2022 9:28 PM    INDICATION: Eval for Infiltrate    COMPARISON: None. FINDINGS: AP portable chest radiograph. The heart size is normal. There are  mild basilar opacities favoring atelectasis. No consolidation is seen. There is  no evidence of effusion or pneumothorax. No displaced fracture is seen. Impression  Mild basilar atelectasis. Recent Labs     12/07/22  0240 12/06/22  1807 12/06/22  1356 12/06/22  1006 12/06/22  0525 12/05/22  2106    141   < > 140   < > 130*   K 4.4 5.1   < > 3.5   < > 4.3    112*   < > 108   < > 84*   CO2 28 15*   < > 15*   < > 9*   BUN 21* 31*   < > 39*   < > 61*   CREA 0.99 0.87   < > 1.28   < > 1.93*   * 170*   < > 188*   < > 608*   PHOS  --   --   --  1.1*  --  5.4*   CA 7.5* 7.8*   < > 8.7   < > 9.0    < > = values in this interval not displayed. Recent Labs     12/07/22  0220 12/06/22  1006   WBC 7.7 8.3   HGB 17.0 13.8   HCT 48.4 39.3    163     Recent Labs     12/06/22  1006 12/05/22  2106   AP 58 80     No results for input(s): CHOL, LDLC in the last 72 hours.     No lab exists for component: TGL, HDLC,  HBA1C      Current meds:    Current Facility-Administered Medications:     glucose chewable tablet 16 g, 4 Tablet, Oral, PRN, Federico Archer MD    glucagon (GLUCAGEN) injection 1 mg, 1 mg, IntraMUSCular, PRN, Federico Archer MD    dextrose 10% infusion 0-250 mL, 0-250 mL, IntraVENous, PRNSuzi Milinda Gunnels, MD    insulin glargine (LANTUS) injection 28 Units, 0.3 Units/kg, SubCUTAneous, DAILY, Federico Archer MD, 28 Units at 12/07/22 0746    insulin lispro (HUMALOG) injection, , SubCUTAneous, TIDAC, Federico Archer MD    sodium chloride (NS) flush 5-10 mL, 5-10 mL, IntraVENous, PRN, Amadou Armijo, Dionna Mac MD    sodium chloride (NS) flush 5-40 mL, 5-40 mL, IntraVENous, Q8H, [facility], 10 mL at 12/07/22 0715    sodium chloride (NS) flush 5-40 mL, 5-40 mL, IntraVENous, PRN, Kisha Oneill MD    acetaminophen (TYLENOL) tablet 650 mg, 650 mg, Oral, Q6H PRN **OR** acetaminophen (TYLENOL) suppository 650 mg, 650 mg, Rectal, Q6H PRN, Kisha Oneill MD    polyethylene glycol (MIRALAX) packet 17 g, 17 g, Oral, DAILY PRN, Kisha Oneill MD    ondansetron (ZOFRAN ODT) tablet 4 mg, 4 mg, Oral, Q8H PRN **OR** ondansetron (ZOFRAN) injection 4 mg, 4 mg, IntraVENous, Q6H PRN, Kisha Oneill MD, 4 mg at 12/06/22 4179    enoxaparin (LOVENOX) injection 40 mg, 40 mg, SubCUTAneous, DAILY, Kisha Oneill MD, 40 mg at 12/06/22 [facility] Cardiology  Call center: (x) [phone]  (I) [phone]      CC:None

## 2022-12-07 NOTE — PROGRESS NOTES
0700- Bedside shift change report given to Ema Sam (oncoming nurse) by Alayna Clifton (offgoing nurse). Report included the following information SBAR, Kardex, ED Summary, Intake/Output, MAR, Recent Results, Cardiac Rhythm ST, and Alarm Parameters . Primary Nurse Paras Estrada RN and Alayna Clifton, RN performed a dual skin assessment on this patient Impairment noted- see wound doc flow sheet  Dax score is 16.    0734- Dr. Baker Phoenix contacted regarding patients new orders. Glargine ordered, DC orders for insulin drip, Bicarb, and 1/2 NS. Dr. Baker Phoenix aware of DKA protocol. 2897- Insulin Glargine administered at this time. Insulin drip will be turned off in 2 hours. Abiodun Gage RN.     0800- Shift assessment complete, see doc flowsheets. 36- IDRs completed with care team. Dcd orders for amlodipine. New order for Lisinipril 5mg PO daily. Paras Estrada RN. 80- Nurse spoke with diabetes management at this time regarding pts diet orders and current insulin. Paras Estrada RN. 1144- BP noted at this time to be 171/97. PRN not given d/t scheduled meds. See MAR. Paras Estrada RN. 1200- Reassessment complete, see doc flowsheets. 1600- Reassessment complete, see doc flowsheets. 1900- Bedside shift change report given to Yenny Gu (oncoming nurse) by Ema Sam (offgoing nurse). Report included the following information SBAR, Kardex, ED Summary, Intake/Output, MAR, Recent Results, Cardiac Rhythm NSR to ST, and Alarm Parameters .

## 2022-12-07 NOTE — PROGRESS NOTES
ICU Progress Note        Subjective: Overnight events noted. Vital Signs:    Visit Vitals  BP (!) 179/105   Pulse (!) 108   Temp 99.1 °F (37.3 °C)   Resp (!) 35   Wt 93.2 kg (205 lb 7.5 oz)   SpO2 95%       O2 Device: None (Room air)       Temp (24hrs), Av.1 °F (37.3 °C), Min:98.7 °F (37.1 °C), Max:99.6 °F (37.6 °C)       Intake/Output:   Last shift:      701 - 1900  In: 180.4 [I.V.:180.4]  Out: -   Last 3 shifts: 1901 - 700  In: 4716.9 [I.V.:4716.9]  Out: 950 [Urine:950]    Intake/Output Summary (Last 24 hours) at 2022 0853  Last data filed at 2022 0800  Gross per 24 hour   Intake 4149.89 ml   Output 550 ml   Net 3599.89 ml       Physical Exam:    General: Alert, awake and oriented. Not in acute distress  HEENT:  Anicteric sclerae; pink palpebral conjunctivae; mucosa moist  Resp:  Bilateral air entry +, no crackles or wheeze  CV:  S1, S2 present  GI:  Abdomen soft, non-tender; (+) active bowel sounds  Extremities:  +2 pulses on all extremities; no edema/ cyanosis/ clubbing noted.  Tremors +  Skin:  Warm; no rashes/ lesions noted  Neurologic:  Non-focal    DATA:     Current Facility-Administered Medications   Medication Dose Route Frequency    glucose chewable tablet 16 g  4 Tablet Oral PRN    glucagon (GLUCAGEN) injection 1 mg  1 mg IntraMUSCular PRN    dextrose 10% infusion 0-250 mL  0-250 mL IntraVENous PRN    insulin glargine (LANTUS) injection 28 Units  0.3 Units/kg SubCUTAneous DAILY    insulin lispro (HUMALOG) injection   SubCUTAneous TIDAC    insulin regular (NOVOLIN R, HUMULIN R) 100 Units in 0.9% sodium chloride 100 mL infusion  0-50 Units/hr IntraVENous TITRATE    amLODIPine (NORVASC) tablet 5 mg  5 mg Oral DAILY    carvediloL (COREG) tablet 3.125 mg  3.125 mg Oral BID WITH MEALS    hydrALAZINE (APRESOLINE) 20 mg/mL injection 10 mg  10 mg IntraVENous Q6H PRN    sodium chloride (NS) flush 5-10 mL  5-10 mL IntraVENous PRN    sodium chloride (NS) flush 5-40 mL 5-40 mL IntraVENous Q8H    sodium chloride (NS) flush 5-40 mL  5-40 mL IntraVENous PRN    acetaminophen (TYLENOL) tablet 650 mg  650 mg Oral Q6H PRN    Or    acetaminophen (TYLENOL) suppository 650 mg  650 mg Rectal Q6H PRN    polyethylene glycol (MIRALAX) packet 17 g  17 g Oral DAILY PRN    ondansetron (ZOFRAN ODT) tablet 4 mg  4 mg Oral Q8H PRN    Or    ondansetron (ZOFRAN) injection 4 mg  4 mg IntraVENous Q6H PRN    enoxaparin (LOVENOX) injection 40 mg  40 mg SubCUTAneous DAILY         Labs: Results:       Chemistry Recent Labs     12/07/22  0756 12/07/22  0240 12/06/22  1807 12/06/22  1356 12/06/22  1006 12/06/22  0525 12/05/22  2106   * 378* 170*   < > 188*   < > 608*    137 141   < > 140   < > 130*   K 3.2* 4.4 5.1   < > 3.5   < > 4.3    103 112*   < > 108   < > 84*   CO2 23 28 15*   < > 15*   < > 9*   BUN 20 21* 31*   < > 39*   < > 61*   CREA 0.82 0.99 0.87   < > 1.28   < > 1.93*   CA 8.4* 7.5* 7.8*   < > 8.7   < > 9.0   AGAP 11 6 14   < > 17*   < > 37*   BUCR 24* 21* 36*   < > 30*   < > 32*   AP  --   --   --   --  58  --  80   TP  --   --   --   --  7.1  --  8.4*   ALB  --   --   --   --  3.2*  --  4.4   GLOB  --   --   --   --  3.9  --  4.0   AGRAT  --   --   --   --  0.8*  --  1.1    < > = values in this interval not displayed. CBC w/Diff Recent Labs     12/07/22  0220 12/06/22  1006 12/05/22  2106   WBC 7.7 8.3 12.6*   RBC 5.76* 4.59 5.97*   HGB 17.0 13.8 17.8*   HCT 48.4 39.3 52.1*    163 233   GRANS 78* 81* 80*   LYMPH 13 8* 8*   EOS 0 0 0      Coagulation No results for input(s): PTP, INR, APTT, INREXT in the last 72 hours.     Liver Enzymes Recent Labs     12/06/22  1006   TP 7.1   ALB 3.2*   AP 58      ABG No results found for: PH, PHI, PCO2, PCO2I, PO2, PO2I, HCO3, HCO3I, FIO2, FIO2I   Microbiology Recent Labs     12/05/22  2106   CULT NO GROWTH AFTER 7 HOURS  NO GROWTH AFTER 7 HOURS        maging:  CXR Results  (Last 48 hours)                 12/07/22 0749  XR CHEST PORT Final result    Impression:      Interstitial opacities bilaterally with some improvement. Narrative:  EXAM:  XR CHEST PORT       INDICATION: Coumadin tachycardia       COMPARISON: 12/5/2022       TECHNIQUE: Upright portable chest AP view       FINDINGS: Cardiac monitoring leads. The cardiac silhouette is within normal   limits. The pulmonary vasculature is within normal limits. Mild interstitial opacities bilaterally improved. The visualized bones and upper   abdomen are age-appropriate. 12/05/22 2128  XR CHEST PORT Final result    Impression:  Mild basilar atelectasis. Narrative:  EXAM: XR CHEST PORT       DATE: 12/5/2022 9:28 PM       INDICATION: Eval for Infiltrate       COMPARISON: None. FINDINGS: AP portable chest radiograph. The heart size is normal. There are   mild basilar opacities favoring atelectasis. No consolidation is seen. There is   no evidence of effusion or pneumothorax. No displaced fracture is seen. CT Results  (Last 48 hours)      None                 Assessment and Plan:    The patient is a 52/M with DKA. DKA - Wean off insulin gtt. Start NPH. Start diet. Replace K. D/c bicarb. HTN - Cardiology started PO medications. SVT - Overnight, Cardiology following. COVID 19 - asymptomatic. Transfer to telemetry.      Jaydon Murillo MD,ALYSSA, FCCM, FCCP, ATSF, FACP, DAABIP  Interventional Pulmonology/Critical [address]

## 2022-12-07 NOTE — PROGRESS NOTES
[facility]: MED RECONCILIATION    Information obtained from: Care everywhere [facility] records); Patient over the phone    Allergies: Patient has no known allergies. Prior to Admission Medications:     Medication Documentation Review Audit       Reviewed by Jovanna Hurley PHARMD (Pharmacist) on 12/07/22 at 0312 [id number]      Medication Sig Documenting Provider Last Dose Status Taking? amLODIPine (NORVASC) 10 mg tablet Take 5 mg by mouth daily. Provider, Historical  Active Yes   empagliflozin (Jardiance) 25 mg tablet Take 12.5 mg by mouth daily. Provider, Historical  Active Yes   insulin aspart U-100 (NOVOLOG) 100 unit/mL (3 mL) inpn 20-25 Units by SubCUTAneous route Before breakfast, lunch, and dinner. Provider, Historical  Active Yes   insulin glargine (Lantus Solostar U-100 Insulin) 100 unit/mL (3 mL) inpn 50 Units by SubCUTAneous route nightly. Provider, Historical  Active Yes   metFORMIN (GLUMETZA) 1,000 mg TG24 24 hour tablet Take 1,000 mg by mouth daily (with dinner). Provider, Historical  Active Yes   semaglutide (Ozempic) 1 mg/dose (2 mg/1.5 mL) sub-q pen 1 mg by SubCUTAneous route every Tuesday. Provider, Historical  Active Yes   simvastatin (Zocor) 40 mg tablet Take 20 mg by mouth nightly.  Provider, Historical  Active Yes                  Paul Barraza [facility]   Contact: [phone]

## 2022-12-07 NOTE — PROGRESS NOTES
[address] [facility]  [facility]ospitalist Group                                                                                          Hospitalist Progress Note  Mario Eldridge MD  Answering service: [phone] OR [phone] from in house phone        Date of Service:  2022  NAME:  Dave Mcelroy  :  1970  MRN:  [id number]      Admission Summary:   Patient is a pleasant 80-year-old gentleman with a past medical history of diabetes who presented to the emergency room due to nausea and vomiting and was admitted for DKA       Interval history / Subjective:   Patient remains in the ICU on an insulin drip. Acidosis persists, that has slowly closed. Bicarb drip has been started. Patient continues to feel poorly     Assessment & Plan:     DKA  Metabolic acidosis  --Continue insulin drip  --Sliding scale insulin and basal insulin started  --Continue electrolyte repletion  --Start bicarb drip  --Anion gap 13-14, bicarb remains decreased at 15  --Continue to trend CMP  --Continue electrolyte repletion  --Discussed extensively with nursing and patient at bedside  --Critical care following, we appreciate their help     COVID-19  --Not hypoxic  --Does not meet criteria for remdesivir or Paxlovid  -- No need for steroids at this time     Code status: Full  Prophylaxis: Heparin  Care Plan discussed with: Patient, RN  Anticipated Disposition: Home with [facility]    39 Min Critical care time     Hospital Problems  Never Reviewed            Codes Class Noted POA    [facility] ICD-10-CM: E11.10  ICD-9-CM: 250.12  2022 Unknown             Review of Systems:   A comprehensive review of systems was negative except for that written in the HPI. Vital Signs:    Last 24hrs VS reviewed since prior progress note.  Most recent are:  Visit Vitals  BP (!) 160/98 (BP 1 Location: Right upper arm, BP Patient Position: At rest)   Pulse (!) 117   Temp 98.7 °F (37.1 °C)   Resp (!) 37   Wt 93.6 kg (206 lb 5.6 oz)   SpO2 99%         Intake/Output Summary (Last 24 hours) at 12/6/2022 1947  Last data filed at 12/6/2022 1600  Gross per 24 hour   Intake 1937.98 ml   Output 950 ml   Net 987.98 ml        Physical Examination:     I had a face to face encounter with this patient and independently examined them on 12/6/2022 as outlined below:          Constitutional:  No acute distress, cooperative, pleasant    ENT:  Oral mucosa moist, oropharynx benign. Resp:  CTA bilaterally. No wheezing/rhonchi/rales. No accessory muscle use. CV:  Regular rhythm, normal rate, no murmurs, gallops, rubs    GI:  Soft, non distended, non tender. normoactive bowel sounds, no hepatosplenomegaly     Musculoskeletal:  No edema, warm, 2+ pulses throughout    Neurologic:  Moves all extremities. AAOx3, CN II-XII reviewed            Data Review:    Review and/or order of clinical lab test  Review and/or order of tests in the radiology section of CPT  Review and/or order of tests in the medicine section of CPT  Discussion of test results with performing physician      Labs:     Recent Labs     12/06/22  1006 12/05/22  2106   WBC 8.3 12.6*   HGB 13.8 17.8*   HCT 39.3 52.1*    233     Recent Labs     12/06/22  1807 12/06/22  1356 12/06/22  1006 12/06/22  0525 12/05/22  2106    141 140   < > 130*   K 5.1 3.5 3.5   < > 4.3   * 111* 108   < > 84*   CO2 15* 18* 15*   < > 9*   BUN 31* 34* 39*   < > 61*   CREA 0.87 1.11 1.28   < > 1.93*   * 138* 188*   < > 608*   CA 7.8* 8.3* 8.7   < > 9.0   MG 2.1 2.2 2.4   < > 2.9*   PHOS  --   --  1.1*  --  5.4*    < > = values in this interval not displayed. Recent Labs     12/06/22  1006 12/05/22  2106   ALT 17 12   AP 58 80   TBILI 0.4 0.3   TP 7.1 8.4*   ALB 3.2* 4.4   GLOB 3.9 4.0     No results for input(s): INR, PTP, APTT, INREXT in the last 72 hours. No results for input(s): FE, TIBC, PSAT, FERR in the last 72 hours.    No results found for: FOL, RBCF   No results for input(s): PH, PCO2, PO2 in the last 72 hours. No results for input(s): CPK, CKNDX, TROIQ in the last 72 hours.     No lab exists for component: CPKMB  No results found for: CHOL, CHOLX, CHLST, CHOLV, HDL, HDLP, LDL, LDLC, DLDLP, TGLX, TRIGL, TRIGP, CHHD, CHHDX  Lab Results   Component Value Date/Time    Glucose (POC) 184 (H) 12/06/2022 06:58 PM    Glucose (POC) 157 (H) 12/06/2022 06:09 PM    Glucose (POC) 125 (H) 12/06/2022 05:01 PM    Glucose (POC) 122 (H) 12/06/2022 04:07 PM    Glucose (POC) 164 (H) 12/06/2022 02:09 PM     Lab Results   Component Value Date/Time    Color YELLOW/STRAW 12/05/2022 09:06 PM    Appearance CLEAR 12/05/2022 09:06 PM    Specific gravity 1.020 12/05/2022 09:06 PM    pH (UA) 5.0 12/05/2022 09:06 PM    Protein TRACE (A) 12/05/2022 09:06 PM    Glucose >1,000 (A) 12/05/2022 09:06 PM    Ketone >80 (A) 12/05/2022 09:06 PM    Urobilinogen 0.2 12/05/2022 09:06 PM    Nitrites Negative 12/05/2022 09:06 PM    Leukocyte Esterase Negative 12/05/2022 09:06 PM    Epithelial cells FEW 12/05/2022 09:06 PM    Bacteria Negative 12/05/2022 09:06 PM    WBC 0-4 12/05/2022 09:06 PM    RBC 0-5 12/05/2022 09:06 PM         Medications Reviewed:     Current Facility-Administered Medications   Medication Dose Route Frequency    dextrose 5% - 0.45% NaCl with KCl 20 mEq/L infusion  125 mL/hr IntraVENous CONTINUOUS    sodium bicarbonate (8.4%) 150 mEq in sterile water 1,000 mL infusion   IntraVENous CONTINUOUS    sodium chloride (NS) flush 5-10 mL  5-10 mL IntraVENous PRN    insulin regular (NOVOLIN R, HUMULIN R) 100 Units in 0.9% sodium chloride 100 mL infusion  0-50 Units/hr IntraVENous TITRATE    insulin lispro (HUMALOG) injection   SubCUTAneous TIDAC    glucose chewable tablet 16 g  4 Tablet Oral PRN    glucagon (GLUCAGEN) injection 1 mg  1 mg IntraMUSCular PRN    dextrose 10% infusion 0-250 mL  0-250 mL IntraVENous PRN    sodium chloride (NS) flush 5-40 mL  5-40 mL IntraVENous Q8H    sodium chloride (NS) flush 5-40 mL  5-40 mL IntraVENous PRN    acetaminophen (TYLENOL) tablet 650 mg  650 mg Oral Q6H PRN    Or    acetaminophen (TYLENOL) suppository 650 mg  650 mg Rectal Q6H PRN    polyethylene glycol (MIRALAX) packet 17 g  17 g Oral DAILY PRN    ondansetron (ZOFRAN ODT) tablet 4 mg  4 mg Oral Q8H PRN    Or    ondansetron (ZOFRAN) injection 4 mg  4 mg IntraVENous Q6H PRN    enoxaparin (LOVENOX) injection 40 mg  40 mg SubCUTAneous DAILY     ______________________________________________________________________  EXPECTED LENGTH OF STAY: 3d 19h  ACTUAL LENGTH OF STAY:          1                 Lorraine Roberts MD

## 2022-12-07 NOTE — PROGRESS NOTES
Transition of care care note:    RUR 9%(low readmission risk score)    Healthcare Decision Maker:            Susie Lilly [phone]                  Transition of Care Plan:                 Admitted with:  DKA  Covid +     Of note:  Pt has [employer] only so when discharged,pt will need hard scripts to take with him [facility] the [employer]

## 2022-12-08 ENCOUNTER — APPOINTMENT (OUTPATIENT)
Dept: NON INVASIVE DIAGNOSTICS | Age: 52
DRG: 177 | End: 2022-12-08
Attending: NURSE PRACTITIONER
Payer: OTHER GOVERNMENT

## 2022-12-08 LAB
ALBUMIN SERPL-MCNC: 2.4 G/DL (ref 3.5–5)
ALBUMIN/GLOB SERPL: 0.7 {RATIO} (ref 1.1–2.2)
ALP SERPL-CCNC: 49 U/L (ref 45–117)
ALT SERPL-CCNC: 18 U/L (ref 12–78)
ANION GAP SERPL CALC-SCNC: 16 MMOL/L (ref 5–15)
AST SERPL-CCNC: 22 U/L (ref 15–37)
BASOPHILS # BLD: 0 K/UL (ref 0–0.1)
BASOPHILS NFR BLD: 0 % (ref 0–1)
BILIRUB SERPL-MCNC: 0.7 MG/DL (ref 0.2–1)
BUN SERPL-MCNC: 18 MG/DL (ref 6–20)
BUN/CREAT SERPL: 31 (ref 12–20)
CALCIUM SERPL-MCNC: 8.1 MG/DL (ref 8.5–10.1)
CHLORIDE SERPL-SCNC: 102 MMOL/L (ref 97–108)
CO2 SERPL-SCNC: 19 MMOL/L (ref 21–32)
CREAT SERPL-MCNC: 0.58 MG/DL (ref 0.7–1.3)
CRP SERPL-MCNC: 14.3 MG/DL (ref 0–0.6)
D DIMER PPP FEU-MCNC: 5.74 MG/L FEU (ref 0–0.65)
DIFFERENTIAL METHOD BLD: ABNORMAL
EOSINOPHIL # BLD: 0 K/UL (ref 0–0.4)
EOSINOPHIL NFR BLD: 0 % (ref 0–7)
ERYTHROCYTE [DISTWIDTH] IN BLOOD BY AUTOMATED COUNT: 12.1 % (ref 11.5–14.5)
GLOBULIN SER CALC-MCNC: 3.4 G/DL (ref 2–4)
GLUCOSE BLD STRIP.AUTO-MCNC: 189 MG/DL (ref 65–117)
GLUCOSE BLD STRIP.AUTO-MCNC: 190 MG/DL (ref 65–117)
GLUCOSE BLD STRIP.AUTO-MCNC: 202 MG/DL (ref 65–117)
GLUCOSE BLD STRIP.AUTO-MCNC: 206 MG/DL (ref 65–117)
GLUCOSE SERPL-MCNC: 222 MG/DL (ref 65–100)
HCT VFR BLD AUTO: 43.5 % (ref 36.6–50.3)
HGB BLD-MCNC: 15.3 G/DL (ref 12.1–17)
IMM GRANULOCYTES # BLD AUTO: 0.1 K/UL (ref 0–0.04)
IMM GRANULOCYTES NFR BLD AUTO: 1 % (ref 0–0.5)
LYMPHOCYTES # BLD: 1.1 K/UL (ref 0.8–3.5)
LYMPHOCYTES NFR BLD: 12 % (ref 12–49)
MAGNESIUM SERPL-MCNC: 1.9 MG/DL (ref 1.6–2.4)
MCH RBC QN AUTO: 29.5 PG (ref 26–34)
MCHC RBC AUTO-ENTMCNC: 35.2 G/DL (ref 30–36.5)
MCV RBC AUTO: 83.8 FL (ref 80–99)
MONOCYTES # BLD: 0.7 K/UL (ref 0–1)
MONOCYTES NFR BLD: 8 % (ref 5–13)
NEUTS SEG # BLD: 7.1 K/UL (ref 1.8–8)
NEUTS SEG NFR BLD: 79 % (ref 32–75)
NRBC # BLD: 0 K/UL (ref 0–0.01)
NRBC BLD-RTO: 0 PER 100 WBC
PHOSPHATE SERPL-MCNC: 2.1 MG/DL (ref 2.6–4.7)
PLATELET # BLD AUTO: 171 K/UL (ref 150–400)
PMV BLD AUTO: 10.1 FL (ref 8.9–12.9)
POTASSIUM SERPL-SCNC: 3.3 MMOL/L (ref 3.5–5.1)
PROT SERPL-MCNC: 5.8 G/DL (ref 6.4–8.2)
RBC # BLD AUTO: 5.19 M/UL (ref 4.1–5.7)
SERVICE CMNT-IMP: ABNORMAL
SODIUM SERPL-SCNC: 137 MMOL/L (ref 136–145)
WBC # BLD AUTO: 9 K/UL (ref 4.1–11.1)

## 2022-12-08 PROCEDURE — 83735 ASSAY OF MAGNESIUM: CPT

## 2022-12-08 PROCEDURE — 97530 THERAPEUTIC ACTIVITIES: CPT

## 2022-12-08 PROCEDURE — 74011250637 HC RX REV CODE- 250/637: Performed by: INTERNAL MEDICINE

## 2022-12-08 PROCEDURE — APPNB15 APP NON BILLABLE TIME 0-15 MINS: Performed by: NURSE PRACTITIONER

## 2022-12-08 PROCEDURE — 65270000029 HC RM PRIVATE

## 2022-12-08 PROCEDURE — 85379 FIBRIN DEGRADATION QUANT: CPT

## 2022-12-08 PROCEDURE — 74011636637 HC RX REV CODE- 636/637: Performed by: INTERNAL MEDICINE

## 2022-12-08 PROCEDURE — 85025 COMPLETE CBC W/AUTO DIFF WBC: CPT

## 2022-12-08 PROCEDURE — 74011000250 HC RX REV CODE- 250: Performed by: INTERNAL MEDICINE

## 2022-12-08 PROCEDURE — 84100 ASSAY OF PHOSPHORUS: CPT

## 2022-12-08 PROCEDURE — 36415 COLL VENOUS BLD VENIPUNCTURE: CPT

## 2022-12-08 PROCEDURE — 77010033678 HC OXYGEN DAILY

## 2022-12-08 PROCEDURE — 74011250636 HC RX REV CODE- 250/636: Performed by: INTERNAL MEDICINE

## 2022-12-08 PROCEDURE — 80053 COMPREHEN METABOLIC PANEL: CPT

## 2022-12-08 PROCEDURE — 97165 OT EVAL LOW COMPLEX 30 MIN: CPT

## 2022-12-08 PROCEDURE — 86140 C-REACTIVE PROTEIN: CPT

## 2022-12-08 PROCEDURE — 74011250637 HC RX REV CODE- 250/637: Performed by: HOSPITALIST

## 2022-12-08 PROCEDURE — 97116 GAIT TRAINING THERAPY: CPT

## 2022-12-08 PROCEDURE — 94761 N-INVAS EAR/PLS OXIMETRY MLT: CPT

## 2022-12-08 PROCEDURE — 97161 PT EVAL LOW COMPLEX 20 MIN: CPT

## 2022-12-08 PROCEDURE — 97162 PT EVAL MOD COMPLEX 30 MIN: CPT

## 2022-12-08 PROCEDURE — 82962 GLUCOSE BLOOD TEST: CPT

## 2022-12-08 PROCEDURE — 99232 SBSQ HOSP IP/OBS MODERATE 35: CPT

## 2022-12-08 RX ORDER — DEXAMETHASONE 6 MG/1
6 TABLET ORAL DAILY
Status: DISCONTINUED | OUTPATIENT
Start: 2022-12-08 | End: 2022-12-09

## 2022-12-08 RX ORDER — GUAIFENESIN 600 MG/1
600 TABLET, EXTENDED RELEASE ORAL EVERY 12 HOURS
Status: DISCONTINUED | OUTPATIENT
Start: 2022-12-08 | End: 2022-12-10 | Stop reason: HOSPADM

## 2022-12-08 RX ORDER — POTASSIUM CHLORIDE 750 MG/1
20 TABLET, FILM COATED, EXTENDED RELEASE ORAL
Status: COMPLETED | OUTPATIENT
Start: 2022-12-08 | End: 2022-12-08

## 2022-12-08 RX ORDER — LABETALOL 100 MG/1
100 TABLET, FILM COATED ORAL EVERY 12 HOURS
Status: DISCONTINUED | OUTPATIENT
Start: 2022-12-08 | End: 2022-12-10 | Stop reason: HOSPADM

## 2022-12-08 RX ORDER — IPRATROPIUM BROMIDE AND ALBUTEROL SULFATE 2.5; .5 MG/3ML; MG/3ML
3 SOLUTION RESPIRATORY (INHALATION)
Status: DISCONTINUED | OUTPATIENT
Start: 2022-12-08 | End: 2022-12-10 | Stop reason: HOSPADM

## 2022-12-08 RX ORDER — INSULIN LISPRO 100 [IU]/ML
6 INJECTION, SOLUTION INTRAVENOUS; SUBCUTANEOUS
Status: DISCONTINUED | OUTPATIENT
Start: 2022-12-08 | End: 2022-12-10 | Stop reason: HOSPADM

## 2022-12-08 RX ORDER — SODIUM CHLORIDE 9 MG/ML
50 INJECTION, SOLUTION INTRAVENOUS CONTINUOUS
Status: DISCONTINUED | OUTPATIENT
Start: 2022-12-08 | End: 2022-12-10 | Stop reason: HOSPADM

## 2022-12-08 RX ADMIN — SODIUM CHLORIDE 100 ML/HR: 9 INJECTION, SOLUTION INTRAVENOUS at 09:10

## 2022-12-08 RX ADMIN — POTASSIUM CHLORIDE 20 MEQ: 750 TABLET, FILM COATED, EXTENDED RELEASE ORAL at 06:44

## 2022-12-08 RX ADMIN — GUAIFENESIN 600 MG: 600 TABLET ORAL at 10:28

## 2022-12-08 RX ADMIN — INSULIN GLARGINE 28 UNITS: 100 INJECTION, SOLUTION SUBCUTANEOUS at 08:02

## 2022-12-08 RX ADMIN — GUAIFENESIN 600 MG: 600 TABLET ORAL at 22:34

## 2022-12-08 RX ADMIN — Medication 2 UNITS: at 22:35

## 2022-12-08 RX ADMIN — LABETALOL HYDROCHLORIDE 20 MG: 5 INJECTION, SOLUTION INTRAVENOUS at 02:10

## 2022-12-08 RX ADMIN — ONDANSETRON 4 MG: 2 INJECTION INTRAMUSCULAR; INTRAVENOUS at 22:34

## 2022-12-08 RX ADMIN — DEXAMETHASONE 6 MG: 6 TABLET ORAL at 09:11

## 2022-12-08 RX ADMIN — POTASSIUM PHOSPHATE, MONOBASIC AND POTASSIUM PHOSPHATE, DIBASIC: 224; 236 INJECTION, SOLUTION, CONCENTRATE INTRAVENOUS at 09:11

## 2022-12-08 RX ADMIN — LISINOPRIL 5 MG: 5 TABLET ORAL at 08:03

## 2022-12-08 RX ADMIN — ZINC SULFATE 220 MG (50 MG) CAPSULE 1 CAPSULE: CAPSULE at 08:03

## 2022-12-08 RX ADMIN — PHENOL 1 SPRAY: 1.5 LIQUID ORAL at 16:20

## 2022-12-08 RX ADMIN — INSULIN LISPRO 6 UNITS: 100 INJECTION, SOLUTION INTRAVENOUS; SUBCUTANEOUS at 11:20

## 2022-12-08 RX ADMIN — Medication 3 UNITS: at 11:21

## 2022-12-08 RX ADMIN — ENOXAPARIN SODIUM 40 MG: 100 INJECTION SUBCUTANEOUS at 08:03

## 2022-12-08 RX ADMIN — Medication 4 UNITS: at 08:02

## 2022-12-08 RX ADMIN — DILTIAZEM HYDROCHLORIDE 120 MG: 120 CAPSULE, COATED, EXTENDED RELEASE ORAL at 08:03

## 2022-12-08 RX ADMIN — LABETALOL HYDROCHLORIDE 100 MG: 100 TABLET, FILM COATED ORAL at 09:12

## 2022-12-08 RX ADMIN — SODIUM CHLORIDE 100 ML/HR: 9 INJECTION, SOLUTION INTRAVENOUS at 22:35

## 2022-12-08 RX ADMIN — Medication 10 ML: at 22:35

## 2022-12-08 RX ADMIN — OXYCODONE HYDROCHLORIDE AND ACETAMINOPHEN 500 MG: 500 TABLET ORAL at 08:03

## 2022-12-08 RX ADMIN — LABETALOL HYDROCHLORIDE 100 MG: 100 TABLET, FILM COATED ORAL at 22:34

## 2022-12-08 RX ADMIN — INSULIN LISPRO 6 UNITS: 100 INJECTION, SOLUTION INTRAVENOUS; SUBCUTANEOUS at 17:22

## 2022-12-08 RX ADMIN — Medication 10 ML: at 06:44

## 2022-12-08 RX ADMIN — INSULIN HUMAN 28 UNITS: 100 INJECTION, SUSPENSION SUBCUTANEOUS at 11:20

## 2022-12-08 RX ADMIN — ONDANSETRON 4 MG: 2 INJECTION INTRAMUSCULAR; INTRAVENOUS at 11:21

## 2022-12-08 RX ADMIN — INSULIN LISPRO 6 UNITS: 100 INJECTION, SOLUTION INTRAVENOUS; SUBCUTANEOUS at 09:11

## 2022-12-08 RX ADMIN — Medication 3 UNITS: at 17:23

## 2022-12-08 RX ADMIN — ACETAMINOPHEN 650 MG: 325 TABLET ORAL at 08:18

## 2022-12-08 NOTE — PROGRESS NOTES
Problem: Airway Clearance - Ineffective  Goal: Achieve or maintain patent airway  12/8/2022 0120 by Perico Mcknight RN  Outcome: Progressing Towards Goal  12/8/2022 0120 by Perico Mcknight RN  Outcome: Progressing Towards Goal     Problem: Gas Exchange - Impaired  Goal: Absence of hypoxia  12/8/2022 0120 by Perico Mcknight RN  Outcome: Progressing Towards Goal  12/8/2022 0120 by Perico Mcknight RN  Outcome: Progressing Towards Goal  Goal: Promote optimal lung function  12/8/2022 0120 by Perico Mcknight RN  Outcome: Progressing Towards Goal  12/8/2022 0120 by Perico Mcknight RN  Outcome: Progressing Towards Goal     Problem: Breathing Pattern - Ineffective  Goal: Ability to achieve and maintain a regular respiratory rate  12/8/2022 0120 by Perico Mcknight RN  Outcome: Progressing Towards Goal  12/8/2022 0120 by Perico Mcknight RN  Outcome: Progressing Towards Goal     Problem:  Body Temperature -  Risk of, Imbalanced  Goal: Ability to maintain a body temperature within defined limits  12/8/2022 0120 by Perico Mcknight RN  Outcome: Progressing Towards Goal  12/8/2022 0120 by Perico Mcknight RN  Outcome: Progressing Towards Goal  Goal: Will regain or maintain usual level of consciousness  12/8/2022 0120 by Perico Mcknight RN  Outcome: Progressing Towards Goal  12/8/2022 0120 by Perico Mcknight RN  Outcome: Progressing Towards Goal  Goal: Complications related to the disease process, condition or treatment will be avoided or minimized  12/8/2022 0120 by Perico Mcknight RN  Outcome: Progressing Towards Goal  12/8/2022 0120 by Perico Mcknight RN  Outcome: Progressing Towards Goal     Problem: Isolation Precautions - Risk of Spread of Infection  Goal: Prevent transmission of infectious organism to others  12/8/2022 0120 by Perico Mcknight RN  Outcome: Progressing Towards Goal  12/8/2022 0120 by Perico Mcknight RN  Outcome: Progressing Towards Goal     Problem: Nutrition Deficits  Goal: Optimize nutrtional status  12/8/2022 0120 by Perico Mcknight RN  Outcome: Progressing Towards Goal  12/8/2022 0120 by Shelby Spence RN  Outcome: Progressing Towards Goal     Problem: Risk for Fluid Volume Deficit  Goal: Maintain normal heart rhythm  12/8/2022 0120 by Shelby Spence RN  Outcome: Progressing Towards Goal  12/8/2022 0120 by Shelby Spence RN  Outcome: Progressing Towards Goal  Goal: Maintain absence of muscle cramping  12/8/2022 0120 by Shelby Spence RN  Outcome: Progressing Towards Goal  12/8/2022 0120 by Shelby Spence RN  Outcome: Progressing Towards Goal  Goal: Maintain normal serum potassium, sodium, calcium, phosphorus, and pH  12/8/2022 0120 by Shelby Spence RN  Outcome: Progressing Towards Goal  12/8/2022 0120 by Shelby Spence RN  Outcome: Progressing Towards Goal     Problem: Loneliness or Risk for Loneliness  Goal: Demonstrate positive use of time alone when socialization is not possible  12/8/2022 0120 by Shelby Spence RN  Outcome: Progressing Towards Goal  12/8/2022 0120 by Shelby Spence RN  Outcome: Progressing Towards Goal     Problem: Fatigue  Goal: Verbalize increase energy and improved vitality  12/8/2022 0120 by Shelby Spence RN  Outcome: Progressing Towards Goal  12/8/2022 0120 by Shelby Spence RN  Outcome: Progressing Towards Goal     Problem: Patient Education: Go to Patient Education Activity  Goal: Patient/Family Education  12/8/2022 0120 by Shelby Spence RN  Outcome: Progressing Towards Goal  12/8/2022 0120 by Shelby Spence RN  Outcome: Progressing Towards Goal     Problem: Falls - Risk of  Goal: *Absence of Falls  Description: Document Liyah Banda Fall Risk and appropriate interventions in the flowsheet.   12/8/2022 0120 by Shelby Spence RN  Outcome: Progressing Towards Goal  Note: Fall Risk Interventions:  Mobility Interventions: Assess mobility with egress test, Bed/chair exit alarm    Mentation Interventions: Adequate sleep, hydration, pain control, Bed/chair exit alarm    Medication Interventions: Bed/chair exit alarm, Patient to call before getting OOB    Elimination Interventions: Bed/chair exit alarm, Call light in reach           12/8/2022 0120 by Evon Lechuga RN  Outcome: Progressing Towards Goal  Note: Fall Risk Interventions:  Mobility Interventions: Assess mobility with egress test, Bed/chair exit alarm    Mentation Interventions: Adequate sleep, hydration, pain control, Bed/chair exit alarm    Medication Interventions: Bed/chair exit alarm, Patient to call before getting OOB    Elimination Interventions: Bed/chair exit alarm, Call light in reach              Problem: Patient Education: Go to Patient Education Activity  Goal: Patient/Family Education  12/8/2022 0120 by Evon Lechuga RN  Outcome: Progressing Towards Goal  12/8/2022 0120 by Evon Lechuga RN  Outcome: Progressing Towards Goal     Problem: Delirium Treatment  Goal: *Level of consciousness restored to baseline  12/8/2022 0120 by Evon Lechuga RN  Outcome: Progressing Towards Goal  12/8/2022 0120 by Evon Lechuga RN  Outcome: Progressing Towards Goal  Goal: *Level of environmental perceptions restored to baseline  12/8/2022 0120 by Evon Lechuga RN  Outcome: Progressing Towards Goal  12/8/2022 0120 by Evon Lechuga RN  Outcome: Progressing Towards Goal  Goal: *Sensory perception restored to baseline  12/8/2022 0120 by Evon Lechuga RN  Outcome: Progressing Towards Goal  12/8/2022 0120 by Evon Lechuga RN  Outcome: Progressing Towards Goal  Goal: *Emotional stability restored to baseline  12/8/2022 0120 by Evon Lechuga RN  Outcome: Progressing Towards Goal  12/8/2022 0120 by Evon Lechuga RN  Outcome: Progressing Towards Goal  Goal: *Functional assessment restored to baseline  12/8/2022 0120 by Evon Lechuga RN  Outcome: Progressing Towards Goal  12/8/2022 0120 by Evon Lechuga RN  Outcome: Progressing Towards Goal  Goal: *Absence of falls  12/8/2022 0120 by Evon Lechuga RN  Outcome: Progressing Towards Goal  12/8/2022 0120 by Evon Lechuga RN  Outcome: Progressing Towards Goal  Goal: *Will remain free of delirium, CAM Score negative  12/8/2022 0120 by Svitlana Deleon RN  Outcome: Progressing Towards Goal  12/8/2022 0120 by Svitlana Deleon RN  Outcome: Progressing Towards Goal  Goal: *Cognitive status will be restored to baseline  12/8/2022 0120 by Svitlana Deleon RN  Outcome: Progressing Towards Goal  12/8/2022 0120 by Svitlana Deleon RN  Outcome: Progressing Towards Goal  Goal: Interventions  12/8/2022 0120 by Svitlana Deleon, RN  Outcome: Progressing Towards Goal  12/8/2022 0120 by Svitlana Deleon RN  Outcome: Progressing Towards Goal     Problem: Patient Education: Go to Patient Education Activity  Goal: Patient/Family Education  12/8/2022 0120 by Svitlana Deleon RN  Outcome: Progressing Towards Goal  12/8/2022 0120 by Svitlana Deleon RN  Outcome: Progressing Towards Goal     Problem: Diabetes Self-Management  Goal: *Disease process and treatment process  Description: Define diabetes and identify own type of diabetes; list 3 options for treating diabetes. 12/8/2022 0120 by Svitlana Deleon RN  Outcome: Progressing Towards Goal  12/8/2022 0120 by Svitlana Deleon RN  Outcome: Progressing Towards Goal  Goal: *Incorporating nutritional management into lifestyle  Description: Describe effect of type, amount and timing of food on blood glucose; list 3 methods for planning meals. 12/8/2022 0120 by Svitlana Deleon RN  Outcome: Progressing Towards Goal  12/8/2022 0120 by Svitlana Deleon RN  Outcome: Progressing Towards Goal  Goal: *Incorporating physical activity into lifestyle  Description: State effect of exercise on blood glucose levels. 12/8/2022 0120 by Svitlana Deleon RN  Outcome: Progressing Towards Goal  12/8/2022 0120 by Svitlana Deleon RN  Outcome: Progressing Towards Goal  Goal: *Developing strategies to promote health/change behavior  Description: Define the ABC's of diabetes; identify appropriate screenings, schedule and personal plan for screenings.   12/8/2022 0120 by Svitlana Deleon RN  Outcome: Progressing Towards Goal  12/8/2022 0120 by Svitlana Deleon RN  Outcome: Progressing Towards Goal  Goal: *Using medications safely  Description: State effect of diabetes medications on diabetes; name diabetes medication taking, action and side effects. 12/8/2022 0120 by Violette Alas RN  Outcome: Progressing Towards Goal  12/8/2022 0120 by Violette Alas RN  Outcome: Progressing Towards Goal  Goal: *Monitoring blood glucose, interpreting and using results  Description: Identify recommended blood glucose targets  and personal targets. 12/8/2022 0120 by Violette Alas RN  Outcome: Progressing Towards Goal  12/8/2022 0120 by Violette Alas RN  Outcome: Progressing Towards Goal  Goal: *Prevention, detection, treatment of acute complications  Description: List symptoms of hyper- and hypoglycemia; describe how to treat low blood sugar and actions for lowering  high blood glucose level. 12/8/2022 0120 by Violette Alas RN  Outcome: Progressing Towards Goal  12/8/2022 0120 by Violette Alas RN  Outcome: Progressing Towards Goal  Goal: *Prevention, detection and treatment of chronic complications  Description: Define the natural course of diabetes and describe the relationship of blood glucose levels to long term complications of diabetes.   12/8/2022 0120 by Violette Alas RN  Outcome: Progressing Towards Goal  12/8/2022 0120 by Violette Alas RN  Outcome: Progressing Towards Goal  Goal: *Developing strategies to address psychosocial issues  Description: Describe feelings about living with diabetes; identify support needed and support network  12/8/2022 0120 by Violette Alas RN  Outcome: Progressing Towards Goal  12/8/2022 0120 by Violette Alas RN  Outcome: Progressing Towards Goal  Goal: *Insulin pump training  12/8/2022 0120 by Violette Alas RN  Outcome: Progressing Towards Goal  12/8/2022 0120 by Violette Alas RN  Outcome: Progressing Towards Goal  Goal: *Sick day guidelines  12/8/2022 0120 by Violette Alas RN  Outcome: Progressing Towards Goal  12/8/2022 0120 by Violette Alas RN  Outcome: Progressing Towards Goal  Goal: *Patient Specific Goal (EDIT GOAL, INSERT TEXT)  12/8/2022 0120 by Lala Pena RN  Outcome: Progressing Towards Goal  12/8/2022 0120 by Lala Pena RN  Outcome: Progressing Towards Goal     Problem: Patient Education: Go to Patient Education Activity  Goal: Patient/Family Education  12/8/2022 0120 by Lala Pena RN  Outcome: Progressing Towards Goal  12/8/2022 0120 by Lala Pena RN  Outcome: Progressing Towards Goal     Problem: Pressure Injury - Risk of  Goal: *Prevention of pressure injury  Description: Document Dax Scale and appropriate interventions in the flowsheet.   12/8/2022 0120 by Lala Pena RN  Outcome: Progressing Towards Goal  Note: Pressure Injury Interventions:       Moisture Interventions: Absorbent underpads, Apply protective barrier, creams and emollients    Activity Interventions: Assess need for specialty bed, Increase time out of bed    Mobility Interventions: Assess need for specialty bed, HOB 30 degrees or less    Nutrition Interventions: Document food/fluid/supplement intake    Friction and Shear Interventions: Apply protective barrier, creams and emollients, Foam dressings/transparent film/skin sealants             12/8/2022 0120 by Lala Pena RN  Outcome: Progressing Towards Goal     Problem: Patient Education: Go to Patient Education Activity  Goal: Patient/Family Education  12/8/2022 0120 by Lala Pena RN  Outcome: Progressing Towards Goal  12/8/2022 0120 by Lala Pena RN  Outcome: Progressing Towards Goal

## 2022-12-08 NOTE — PROGRESS NOTES
Bedside and Verbal shift change report given to [facility] (oncoming nurse) by Asa Flores (offgoing nurse). Report included the following information SBAR, Intake/Output, MAR, Recent Results, Cardiac Rhythm: NSR and Alarm Parameters . Primary Nurse Tomás Person, BRITTANIE and Myra Walton RN performed a dual skin assessment on this patient No impairment noted  Dax score is see flowsheets    See flowsheets for all assessments, see MAR for all medication administrations. 0630: notified Dr Janessa García of morning labs, K 3.2, phos 2.3, CRP 14.3. Bedside and Verbal shift change report given to [address] (oncoming nurse) by Libia Jolly RN (offgoing nurse). Report included the following information SBAR, Intake/Output, MAR, Recent Results, Cardiac Rhythm: and Alarm Parameters .

## 2022-12-08 NOTE — DIABETES MGMT
[address] [facility]    CLINICAL NURSE SPECIALIST CONSULT     Initial Presentation   Jesenia Solis is a 46 y.o. male admitted on 12/05/22 after experiencing hyperglycemia and extreme fatigue, as well as some nausea. LAB: , AG 37, CO2 9, eGFR 41, Na 130, WBC 12.6, positive urine ketones  Chest Xray:IMPRESSION  Mild basilar atelectasis. Covid positive       HX:   Past Medical History:   Diagnosis Date    Diabetes ([facility] [address]     Hyperlipidemia     Hypertension         INITIAL DX:   DKA (diabetic ketoacidosis) ([facility] [address] [E11.10]     Current Treatment     TX: IVF, Basal/mealtime/bolus insulin, DVT prophylaxis, BP management, steroid    Consulted by Provider for advanced diabetes nursing assessment and care for:   [x] Transitioning off Jacpaulina Powell   [x] Inpatient management strategy  [x] Home management assessment  [] Survival skill education    Hospital Course   Clinical progress has been complicated by DKA. Diabetes History   Patient has 25 year history of diabetes. Was diagnosed \"in the 90s\" with labwork after having polyuria, polydipsia, and blurry vision. He states he was not hospitalized at that time. Was started on Metformin at that time and has been on ever since. Has also been taking Ozempic once a week, Lantus 50 units daily, Humalog 25 units with meals, and some kind of \"flozin\" (SGLT-2). Father and sister both have diabetes. Diabetes-related Medical History  Acute complications  DKA  Neurological complications  Peripheral neuropathy  Microvascular disease  Retinopathy  Macrovascular disease  CAD, TIAs    Diabetes Medication History  Key Antihyperglycemic Medications               metFORMIN (GLUMETZA) 1,000 mg TG24 24 hour tablet (Taking) Take 1,000 mg by mouth daily (with dinner). empagliflozin (Jardiance) 25 mg tablet (Taking) Take 12.5 mg by mouth daily.     insulin aspart U-100 (NOVOLOG) 100 unit/mL (3 mL) inpn (Taking) 20-25 Units by SubCUTAneous route Before breakfast, lunch, and dinner. insulin glargine (Lantus Solostar U-100 Insulin) 100 unit/mL (3 mL) inpn (Taking) 50 Units by SubCUTAneous route nightly. semaglutide (Ozempic) 1 mg/dose (2 mg/1.5 mL) sub-q pen (Taking) 1 mg by SubCUTAneous route every Tuesday. Diabetes self-management practices:   Eating pattern   Deferred at this time  Physical activity pattern   Was trying to walk 3 miles/week  Monitoring pattern   States he checks before each meal; BG range 110-220  Taking medications pattern  See above  Social determinants of health impacting diabetes self-management practices   Concerned that you need to know more about how to stay healthy with diabetes  Overall evaluation:    [x] Achieving A1c target with drug therapy & self-care practices: Awaiting A1c result    Subjective   Room dark, on Covid isolation. Patient sleeping  Objective   Physical exam  General Obese male ill-appearing and sleepy, but was able to talk  a bit  Neuro  Alert, oriented   Vital Signs Visit Vitals  BP (!) 159/93   Pulse 94   Temp 98.1 °F (36.7 °C)   Resp 29   Wt 93.2 kg (205 lb 8.8 oz)   SpO2 91%     Skin  Warm and dry. Extremities No foot wounds    Diabetic foot exam:    Deferred    Laboratory  Recent Labs     12/08/22  0531 12/07/22  0756 12/07/22  0240 12/07/22  0220 12/06/22  1356 12/06/22  1006 12/06/22  0525 12/05/22  2106   * 205* 378*  --    < > 188*   < > 608*   AGAP 16* 11 6  --    < > 17*   < > 37*   WBC 9.0  --   --  7.7  --  8.3  --  12.6*   CREA 0.58* 0.82 0.99  --    < > 1.28   < > 1.93*   AST 22  --   --   --   --  19  --  21   ALT 18  --   --   --   --  17  --  12    < > = values in this interval not displayed.          Factors impacting BG management  Factor Dose Comments   Nutrition:  Standard meals     60 gram/meals    Other:   Kidney function  Liver function  Hyperlipidemia   Normal  Normal      Blood glucose pattern      Significant diabetes-related events over the past 24-72 hours  Admission BG 46  AG opened back up this am  Also started on steroid this am  Assessment and Plan   Nursing Diagnosis Risk for unstable blood glucose pattern   Nursing Intervention Domain 5250 Decision-making Support   Nursing Interventions Examined current inpatient diabetes/blood glucose control   Explored factors facilitating and impeding inpatient management  Explored corrective strategies with patient and responsible inpatient provider   Informed patient of rational for insulin strategy while hospitalized     Nursing Diagnosis 47591 Ineffective Health Management   Nursing Intervention Domain 5250 Decision-makingSupport   Nursing Interventions Identified diabetes self-management practices impeding diabetes control  Discussed diabetes survival skills related to  Healthy Plate eating plan; given handouts  Role of physical activity in improving insulin sensitivity and action  Procedure for blood glucose monitoring & options for low-cost products available from [facility]   Medications plan at discharge     Evaluation   This 46 y.o. male was admitted in DKA and is positive for Covid. Per chart, he reportedly is Type 1.5 and has had diabetes for about 25 years. He is followed by pharmacological and diabetes team at the [facility]. He states he has been taking his medications regularly, but states he got so sick recently, that he wasn't eating and  just couldn't function enough to take his insulin. This point, on top of cytokine release with Covid, sent him into DKA.  this am with a 0.3 unit/kg/D dose transitioning off GS yesterday. He received 16 units of correctional insulin yesterday. Mealtime Humalog started this am. However, AG open (16) this am and he was also started on dexamethasone for elevated inflammatory markers. IVF restarted. Will suggest a 0.3 unit/kg dose of NPH this am to override effects of steroid.     This patient would benefit from diabetes self-management education and support (DSMES) after discharge. Recommendations     [x] Use of Subcutaneous Insulin Order set (5616)  Insulin Dosing Specific recommendation   Basal                                      (Based on weight, BMI & GFR)   [x] 0.3 units/kg/D (for diabetes )  0.3 unit/kg dose with each dose of steroid (see chart below)   28 units Lantus daily      28 units NPH linked with steroid   Nutritional                                      (Based on CHO/dextrose load) [] Normal sensitivity  [x] Insulin-resistant sensitivity 6 units Humalog with each consumed meal   Corrective                                       (Useful in adjusting insulin dosing) [] Normal sensitivity  [] HIGH sensitivity  [x] Insulin-resistant sensitivity    In addressing the expected hyperglycemic effect of differing steroids, weight-based insulin dosing may be entertained using is the following table:    Steroid Equivalency for insulin dosing   Insulin dosing Dexamethasone (20-36 hour impact) Methylprednisolone (12-16 hour impact) Prednisone       (12-16 hour impact)   0.4 units/kg > 8mg 36 mg + 40mg   0.3/units/kg 6 mg 24 mg 30mg   0.2/units/kg 4 mg 16 mg 20mg   0.1 units/kg 2 mg 8 mg 10mg      Billing Code(s)   [] 07002 IP subsequent hospital care - 35 minutes [] 96622 Prolonged Services - 65 minutes [] 37770 Prolonged Services - 110 minutes  [x] 58155 IP subsequent hospital care - 25 minutes [] 83915 Prolonged Services - 55 minutes [] 38775 Prolonged Services - 100 minutes  [] 12778 IP subsequent hospital care - 15 minutes [] 19446 Prolonged Services - 45 minutes [] 44288 Prolonged Services - 90 minutes    Before making these care recommendations, I personally reviewed the hospitalization record, including notes, laboratory & diagnostic data and current medications, and examined the patient at the bedside (circumstances permitting) before making care recommendations. More than fifty (50) percent of the time was spent in patient counseling and/or care coordination.   Total minutes: 25    JOHANNE Rangel  Diabetes Clinical Nurse Specialist  [facility]  Access via [facility]

## 2022-12-08 NOTE — PROGRESS NOTES
0700 Bedside and Verbal shift change report given to Milena Otero RN (oncoming nurse) by Adelso Carrillo RN (offgoing nurse). Report included the following information SBAR, Kardex, ED Summary, Procedure Summary, Intake/Output, MAR, Recent Results, Med Rec Status, Cardiac Rhythm Nsr sinus tach, Alarm Parameters , Quality Measures, and Dual Neuro Assessment. Primary Nurse Aly Crawford RN and Adelso Carrillo RN performed a dual skin assessment on this patient Impairment noted- see wound doc flow sheet  Dax score, see flowsheet. 0800 Pt assessed, see flowsheet. 1300 [facility] 1345 TRANSFER - OUT REPORT:    Verbal report given to Lanette(name) on [facility]  being transferred to [facility](unit) for routine progression of care       Report consisted of patients Situation, Background, Assessment and   Recommendations(SBAR). Information from the following report(s) SBAR, Kardex, ED Summary, Procedure Summary, Intake/Output, MAR, Recent Results, Med Rec Status, Cardiac Rhythm NSR, Alarm Parameters , Quality Measures, and Dual Neuro Assessment was reviewed with the receiving nurse. Lines:   Peripheral IV 12/06/22 Left;Posterior Hand (Active)   Site Assessment Clean, dry, & intact 12/08/22 1200   Phlebitis Assessment 0 12/08/22 1200   Infiltration Assessment 0 12/08/22 1200   Dressing Status Clean, dry, & intact 12/08/22 1200   Dressing Type Transparent 12/08/22 1200   Hub Color/Line Status Yellow; Flushed;Capped 12/08/22 1200   Action Taken Open ports on tubing capped 12/08/22 1200   Alcohol Cap Used Yes 12/08/22 1200       Peripheral IV 12/08/22 Anterior; Left Forearm (Active)   Site Assessment Clean, dry, & intact 12/08/22 1200   Phlebitis Assessment 0 12/08/22 1200   Infiltration Assessment 0 12/08/22 1200   Dressing Status Clean, dry, & intact 12/08/22 1200   Dressing Type Transparent 12/08/22 1200   Hub Color/Line Status Pink; Infusing 12/08/22 1200   Action Taken Open ports on tubing capped 12/08/22 1200   Alcohol Cap Used Yes 12/08/22 1200        Opportunity for questions and clarification was provided.       Patient transported with:   Monitor  Registered Nurse

## 2022-12-08 NOTE — PROGRESS NOTES
Chart reviewed  Visit Vitals  BP (!) 157/94   Pulse 93   Temp 99 °F (37.2 °C)   Resp 28   Wt 93.2 kg (205 lb 8.8 oz)   SpO2 91%     No further SVT    Cont CCB and uptitrate as needed for BP    Will follow up on ECHO.      Will see prn

## 2022-12-08 NOTE — PROGRESS NOTES
Problem: Self Care Deficits Care Plan (Adult)  Goal: *Acute Goals and Plan of Care (Insert Text)  Description: FUNCTIONAL STATUS PRIOR TO ADMISSION: Patient was independent and active without use of DME.     HOME SUPPORT: The patient lived with spouse and father in law (patient is primary caregiver completing IADL's of home) but did not require assist.    Occupational Therapy Goals  Initiated 12/8/2022  1. Patient will perform grooming with modified independence within 7 day(s). 2.  Patient will perform bathing with modified independence within 7 day(s). 3.  Patient will perform upper body dressing and lower body dressing with modified independence within 7 day(s). 4.  Patient will perform toilet transfers with modified independence within 7 day(s). 5.  Patient will perform all aspects of toileting with modified independence within 7 day(s). 6.  Patient will participate in upper extremity therapeutic exercise/activities with modified independence for 10 minutes within 7 day(s). 7.  Patient will utilize energy conservation techniques during functional activities with verbal cues within 7 day(s). Outcome: Not Met     OCCUPATIONAL THERAPY EVALUATION  Patient: Patricia Carlson (89 y.o. male)  Date: 12/8/2022  Primary Diagnosis: DKA (diabetic ketoacidosis) ([facility] [address] [E11.10]       Precautions: COVID positive       ASSESSMENT  Patient received semi supine in recliner A&XO4 and agreeable for OT eval/tx. Per pt report, pt lives in 2nd floor [facility] with elevator access with spouse(hx of CVA) and father in law and is independent for self care and functional transfers/mobility (completes IADL's of home).      Patient presents with decreased balance, decreased activity tolerance, generalized weakness and increased need for assist for assist with self care (SBA LB Dressing, SBA/CGA grooming standing sink, SBA toileting/cloth mgmt) and functional transfers/mobility (SBA scooting EOB, CGA sit<->stand and toilet transfer with Rw and gait belt). Patient would benefit from continued skilled OT services while at [facility] in order to increase safety and independence with self care and functional transfers/mobility. Recommend discharge to home with [facility] pending progress next session. Functional Outcome Measure: The patient scored Total: 65/100 on the Barthel Index outcome measure     Other factors to consider for discharge: time since onset, severity of deficits        PLAN :  Recommendations and Planned Interventions: self care training, functional mobility training, therapeutic exercise, balance training, therapeutic activities, endurance activities, patient education, and home safety training    Frequency/Duration: Patient will be followed by occupational therapy 5 times a week to address goals. Recommendation for discharge: (in order for the patient to meet his/her long term goals)  Anticipate Occupational therapy at least 2 days/week in the home  need upon discharge    This discharge recommendation:  Has been made in collaboration with the attending provider and/or case management    IF patient discharges home will need the following DME: TBD       SUBJECTIVE:   Patient stated I feel better than a few hours ago.     OBJECTIVE DATA SUMMARY:   HISTORY:   Past Medical History:   Diagnosis Date    Diabetes ([facility] [address]     Hyperlipidemia     Hypertension    No past surgical history on file.     Expanded or extensive additional review of patient history:     Home Situation  Home Environment:  (2nd floor condo with elevator access)  # Steps to Enter: 0 (elevator)  One/Two Story Residence: One story  Living Alone: No  Support Systems: Spouse/Significant Other (spouse, father in law)  Patient Expects to be Discharged to[de-identified] Home  Current DME Used/Available at Home: Grab bars, Shower chair  Tub or Shower Type: Shower    EXAMINATION OF PERFORMANCE DEFICITS:  Cognitive/Behavioral Status:  Neurologic State: Alert  Orientation Level: Oriented X4  Cognition: Follows commands; Appropriate decision making; Appropriate for age attention/concentration; Appropriate safety awareness     Hearing: Auditory  Auditory Impairment: None    Range of Motion:  AROM: Within functional limits  PROM: Within functional limits     Strength:  Strength: Generally decreased, functional     Balance:  Sitting: Intact  Standing: Impaired; Without support  Standing - Static: Constant support;Good  Standing - Dynamic : Constant support; Fair    Functional Mobility and Transfers for ADLs:  Bed Mobility:  Scooting: Stand-by assistance    Transfers:  Sit to Stand: Contact guard assistance  Stand to Sit: Contact guard assistance  Bathroom Mobility: Contact guard assistance  Toilet Transfer : Contact guard assistance  Assistive Device : Gait Belt;Walker, rolling    ADL Assessment:     Oral Facial Hygiene/Grooming: Contact guard assistance;Stand-by assistance    Lower Body Dressing: Stand-by assistance    Toileting: Stand by assistance     ADL Intervention and task modifications:     Grooming  Grooming Assistance: Contact guard assistance;Stand-by assistance  Position Performed: Standing  Washing Face: Stand-by assistance  Washing Hands: Stand-by assistance  Brushing Teeth: Contact guard assistance;Stand-by assistance    Lower Body Dressing Assistance  Socks: Stand-by assistance  Position Performed: Seated in chair    Toileting  Toileting Assistance: Stand-by assistance  Bladder Hygiene: Stand-by assistance  Bowel Hygiene: Stand-by assistance  Clothing Management: Stand-by assistance    Functional Measure:    Barthel Index:  Bathin  Bladder: 10  Bowels: 10  Groomin  Dressin  Feeding: 10  Mobility: 10 (would be able to with rest break)  Stairs: 0  Toilet Use: 5  Transfer (Bed to Chair and Back): 10  Total: 65/100      The Barthel ADL Index: Guidelines  1. The index should be used as a record of what a patient does, not as a record of what a patient could do.   2. The main aim is to establish degree of independence from any help, physical or verbal, however minor and for whatever reason. 3. The need for supervision renders the patient not independent. 4. A patient's performance should be established using the best available evidence. Asking the patient, friends/relatives and nurses are the usual sources, but direct observation and common sense are also important. However direct testing is not needed. 5. Usually the patient's performance over the preceding 24-48 hours is important, but occasionally longer periods will be relevant. 6. Middle categories imply that the patient supplies over 50 per cent of the effort. 7. Use of aids to be independent is allowed. Score Interpretation (from [address] [facility])    Independent   60-79 Minimally independent   40-59 Partially dependent   20-39 Very dependent   <20 Totally dependent     -Sonia Carrillo., Barthel, DAlexandreW. (1965). Functional evaluation: the Barthel Index. [address] [facility] ([address] (1997). The Barthel activities of daily living index: self-reporting versus actual performance in the old (> or = 75 years). Journal [address] 45(7), [address] [facility], J.J.ARYAN.F, Sarika Ask., [facility]. (1999). Measuring the change in disability after inpatient rehabilitation; comparison of the responsiveness of the Barthel Index and Functional [place] Measure. Journal of Neurology, Neurosurgery, and Psychiatry, 66(4), 518-287. Matt Jones, NYIN.MAGDY, QUINTEN Rice, & Bessy Sow M.A. (2004) Assessment of post-stroke quality of life in cost-effectiveness studies: The usefulness of the Barthel Index and the EuroQoL-5D.  Quality of Life Research, 15, 852-72     Occupational Therapy Evaluation Charge Determination   History Examination Decision-Making   LOW Complexity : Brief history review  LOW Complexity : 1-3 performance deficits relating to physical, cognitive , or psychosocial skils that result in activity limitations and / or participation restrictions  MEDIUM Complexity : Patient may present with comorbidities that affect occupational performnce. Miniml to moderate modification of tasks or assistance (eg, physical or verbal ) with assesment(s) is necessary to enable patient to complete evaluation       Based on the above components, the patient evaluation is determined to be of the following complexity level: LOW   Pain Rating:  No pain reported    Activity Tolerance:   Good and requires rest breaks    After treatment patient left in no apparent distress:    Sitting in chair at sink with PT present    COMMUNICATION/EDUCATION:   The patients plan of care was discussed with: Physical therapist and Registered nurse. Home safety education was provided and the patient/caregiver indicated understanding. and Patient/family have participated as able in goal setting and plan of care. This patients plan of care is appropriate for delegation to [facility].     Thank you for this referral.  Luz Elena Ashford  Time Calculation: 33 mins

## 2022-12-08 NOTE — PROGRESS NOTES
Transition of care note:    RUR 10%    Admitted with:  DKA  Covid +      Healthcare Decision Maker:            Jude Shi [phone]                  Of note:  Pt has [employer] only so when discharged,pt will need hard scripts to take with him to the [facility] letter and clinicals faxed to the [facility] @ [phone] due to pt.'s insurance. TRANSFER - OUT REPORT:    Verbal report given to Zarina Reyes(name) on Tia Pata (patient name)  being transferred to [facility](unit) for routine progression of care   Report consisted of patients Situation, Background, Assessment and   Recommendations(SBAR).      Francis Maldonado

## 2022-12-08 NOTE — PROGRESS NOTES
Lawrence Peoples [facility] [address]  [address] [facility], [address] [place] [address]  [phone]      Hospitalist Progress Note      NAME: Janak Dozier   :  1970  MRM:  [id number]    Date/Time of service: 2022  9:17 AM       Subjective:     Chief Complaint:  Patient was personally seen and examined by me during this time period. Chart reviewed. Still with cough. No chest pain, fevers       Objective:       Vitals:       Last 24hrs VS reviewed since prior progress note.  Most recent are:    Visit Vitals  BP (!) 159/93   Pulse 94   Temp 98.1 °F (36.7 °C)   Resp 29   Wt 93.2 kg (205 lb 8.8 oz)   SpO2 91%     SpO2 Readings from Last 6 Encounters:   22 91%    O2 Flow Rate (L/min): 2 l/min     Intake/Output Summary (Last 24 hours) at 2022 7476  Last data filed at 2022 0800  Gross per 24 hour   Intake 1802.08 ml   Output 2950 ml   Net -1147.92 ml        Exam:     Physical Exam:    Gen:  frail, ill-appearing, NAD  HEENT:  Pink conjunctivae, PERRL, hearing intact to voice, dry mucous membranes  Neck:  Supple, without masses, thyroid non-tender  Resp:  No accessory muscle use, scattered rhonchi   Card:  No murmurs, normal S1, S2 without thrills, bruits or peripheral edema  Abd:  Soft, non-tender, non-distended, normoactive bowel sounds are present  Musc:  No cyanosis or clubbing  Skin:  No rashes   Neuro:  Cranial nerves 3-12 are grossly intact, follows commands appropriately  Psych:  Good insight, oriented to person, place and time, alert    Medications Reviewed: (see below)    Lab Data Reviewed: (see below)    ______________________________________________________________________    Medications:     Current Facility-Administered Medications   Medication Dose Route Frequency    0.9% sodium chloride infusion  100 mL/hr IntraVENous CONTINUOUS    potassium phosphate 30 mmol in 0.9% sodium chloride 250 mL infusion   IntraVENous ONCE    labetaloL (NORMODYNE) tablet 100 mg  100 mg Oral Q12H insulin lispro (HUMALOG) injection 6 Units  6 Units SubCUTAneous TIDAC    dexAMETHasone (DECADRON) tablet 6 mg  6 mg Oral DAILY    glucose chewable tablet 16 g  4 Tablet Oral PRN    glucagon (GLUCAGEN) injection 1 mg  1 mg IntraMUSCular PRN    insulin glargine (LANTUS) injection 28 Units  0.3 Units/kg SubCUTAneous DAILY    lisinopriL (PRINIVIL, ZESTRIL) tablet 5 mg  5 mg Oral DAILY    insulin lispro (HUMALOG) injection   SubCUTAneous AC&HS    dextrose 10% infusion 0-250 mL  0-250 mL IntraVENous PRN    dilTIAZem ER (CARDIZEM CD) capsule 120 mg  120 mg Oral DAILY    labetaloL (NORMODYNE;TRANDATE) 20 mg/4 mL (5 mg/mL) injection 20 mg  20 mg IntraVENous Q4H PRN    ascorbic acid (vitamin C) (VITAMIN C) tablet 500 mg  500 mg Oral DAILY    zinc sulfate (ZINCATE) 50 mg zinc (220 mg) capsule 1 Capsule  1 Capsule Oral DAILY    benzonatate (TESSALON) capsule 100 mg  100 mg Oral TID PRN    sodium chloride (NS) flush 5-10 mL  5-10 mL IntraVENous PRN    sodium chloride (NS) flush 5-40 mL  5-40 mL IntraVENous Q8H    sodium chloride (NS) flush 5-40 mL  5-40 mL IntraVENous PRN    acetaminophen (TYLENOL) tablet 650 mg  650 mg Oral Q6H PRN    Or    acetaminophen (TYLENOL) suppository 650 mg  650 mg Rectal Q6H PRN    polyethylene glycol (MIRALAX) packet 17 g  17 g Oral DAILY PRN    ondansetron (ZOFRAN ODT) tablet 4 mg  4 mg Oral Q8H PRN    Or    ondansetron (ZOFRAN) injection 4 mg  4 mg IntraVENous Q6H PRN    enoxaparin (LOVENOX) injection 40 mg  40 mg SubCUTAneous DAILY          Lab Review:     Recent Labs     12/08/22  0531 12/07/22  0220 12/06/22  1006   WBC 9.0 7.7 8.3   HGB 15.3 17.0 13.8   HCT 43.5 48.4 39.3    177 163     Recent Labs     12/08/22  0531 12/07/22  0756 12/07/22  0240 12/06/22  1356 12/06/22  1006 12/06/22  0525 12/05/22  2106    139 137   < > 140   < > 130*   K 3.3* 3.2* 4.4   < > 3.5   < > 4.3    105 103   < > 108   < > 84*   CO2 19* 23 28   < > 15*   < > 9*   * 205* 378*   < > 188*   < > 608*   BUN 18 20 21*   < > 39*   < > 61*   CREA 0.58* 0.82 0.99   < > 1.28   < > 1.93*   CA 8.1* 8.4* 7.5*   < > 8.7   < > 9.0   MG 1.9 2.1 1.9   < > 2.4   < > 2.9*   PHOS 2.1*  --   --   --  1.1*  --  5.4*   ALB 2.4*  --   --   --  3.2*  --  4.4   TBILI 0.7  --   --   --  0.4  --  0.3   ALT 18  --   --   --  17  --  12    < > = values in this interval not displayed. Lab Results   Component Value Date/Time    Glucose (POC) 206 (H) 12/08/2022 08:01 AM    Glucose (POC) 208 (H) 12/07/2022 09:38 PM    Glucose (POC) 321 (H) 12/07/2022 04:11 PM    Glucose (POC) 203 (H) 12/07/2022 11:04 AM    Glucose (POC) 191 (H) 12/07/2022 09:01 AM          Assessment / Plan:     45 yo hx of HTN, DM type 1, presented w/ DKA, SVT, COVID infection    1) DM type 1 w/ DKA, uncontrolled: A1C 9.9%. Triggered by COVID. AG closed with insulin gtt, but now widening again. Will restart IVF. Cont Lantus, JUAN, SSI. Low threshold to restart insulin gtt    2) COVID-19 infection/pneumonia: has elevated inflammatory labs. Will start dexamethasone, Vit C/Zinc.  Monitor inflammatory labs    3) SVT: due to COVID. Cont oral dilt. Cards following    4) HTN: uncontrolled. Will start oral labetalol. Cont dilt, lisinopril.   Use IV labetalol prn    5) Weakness: consult PT/OT    Total time spent with patient care: 45 Minutes **I personally saw and examined the patient during this time period**                 Care Plan discussed with: Patient, nursing     Discussed:  Care Plan    Prophylaxis:  Lovenox    Disposition:  Home w/Family           ___________________________________________________    Attending Physician: Kulwinder Lawrence MD

## 2022-12-08 NOTE — PROGRESS NOTES
Assisting primary RN- patient c/o of sore throat, OK by Dr. Zara Deleon to place in [place] order PRN

## 2022-12-08 NOTE — PROGRESS NOTES
Problem: Mobility Impaired (Adult and Pediatric)  Goal: *Acute Goals and Plan of Care (Insert Text)  Description: FUNCTIONAL STATUS PRIOR TO ADMISSION: Patient was independent and active without use of DME.    HOME SUPPORT PRIOR TO ADMISSION: The patient lived with his wife and father in-law. The patient was the primary caregiver and tended to household ADLs. Physical Therapy Goals  Initiated 12/8/2022  1. Patient will move from supine to sit and sit to supine  in bed with independence within 7 day(s). 2.  Patient will transfer from bed to chair and chair to bed with modified independence using the least restrictive device within 7 day(s). 3.  Patient will perform sit to stand with modified independence within 7 day(s). 4.  Patient will ambulate with modified independence for 150 feet with the least restrictive device within 7 day(s). Outcome: Progressing Towards Goal   PHYSICAL THERAPY EVALUATION  Patient: Andrade De Leon (84 y.o. male)  Date: 12/8/2022  Primary Diagnosis: DKA (diabetic ketoacidosis) ([facility] [address] [E11.10]       Precautions:        ASSESSMENT  Based on the objective data described below, the patient presents with MARAVILLA, impaired balance, and activity tolerance following admission for DKA and COVID. The demonstrated fair initial mobility requiring CGA overall and utilized a RW for gait stability. He fatigued with activity and required 2 brief standing rest breaks d/t MARAVILLA and fatigue in 20' (but after completing bathroom ADLs with OT). Mobilized on RA SpO2 90% immediately after activity in the room. Recommend assessing O2 further during next session. The patient is typically independent and anticipate good progress towards goals. Pending progress, anticipate discharge home with HHPT and DME if needed. Current Level of Function Impacting Discharge (mobility/balance):  CGA for transfers and gait      Patient will benefit from skilled therapy intervention to address the above noted impairments. PLAN :  Recommendations and Planned Interventions: bed mobility training, transfer training, gait training, therapeutic exercises, neuromuscular re-education, and therapeutic activities      Frequency/Duration: Patient will be followed by physical therapy:  5 times a week to address goals. Recommendation for discharge: (in order for the patient to meet his/her long term goals)  Physical therapy at least 2 days/week in the home  depending on activity tolerance at the time of discharge    This discharge recommendation:  Has not yet been discussed the attending provider and/or case management    IF patient discharges home will need the following DME: to be determined (TBD)         SUBJECTIVE:   Patient stated I feel better today than I have since I got sick.     OBJECTIVE DATA SUMMARY:   HISTORY:    Past Medical History:   Diagnosis Date    Diabetes ([facility] [address]     Hyperlipidemia     Hypertension    No past surgical history on file. Personal factors and/or comorbidities impacting plan of care:     Home Situation  Home Environment:  (2nd floor condo with elevator access)  # Steps to Enter: 0 (elevator)  One/Two Story Residence: One story  Living Alone: No  Support Systems: Spouse/Significant Other (spouse, father in law)  Patient Expects to be Discharged to[de-identified] Home  Current DME Used/Available at Home: Grab bars, Shower chair  Tub or Shower Type: Shower    EXAMINATION/PRESENTATION/DECISION MAKING:   Critical Behavior:  Neurologic State: Alert  Orientation Level: Oriented X4  Cognition: Follows commands, Appropriate decision making, Appropriate for age attention/concentration, Appropriate safety awareness     Hearing:   Auditory  Auditory Impairment: None  Skin:    Edema:   Range Of Motion:  AROM: Within functional limits           PROM: Within functional limits           Strength:    Strength: Generally decreased, functional                    Tone & Sensation: Coordination:     Vision:      Functional Mobility:  Bed Mobility:           Scooting: Stand-by assistance  Transfers:  Sit to Stand: Contact guard assistance  Stand to Sit: Contact guard assistance                       Balance:   Sitting: Intact  Standing: Impaired; Without support  Standing - Static: Constant support;Good  Standing - Dynamic : Constant support; Fair  Ambulation/Gait Training:  Distance (ft): 20 Feet (ft)  Assistive Device: Gait belt;Walker, rolling  Ambulation - Level of Assistance: Contact guard assistance     Gait Description (WDL): Exceptions to WDL  Gait Abnormalities: Trunk sway increased              Speed/Jenni: Shuffled;Slow;Pace decreased (<100 feet/min)  Step Length: Left shortened;Right shortened                           Physical Therapy Evaluation Charge Determination   History Examination Presentation Decision-Making   MEDIUM  Complexity : 1-2 comorbidities / personal factors will impact the outcome/ POC  MEDIUM Complexity : 3 Standardized tests and measures addressing body structure, function, activity limitation and / or participation in recreation  LOW Complexity : Stable, uncomplicated  LOW Complexity : FOTO score of       Based on the above components, the patient evaluation is determined to be of the following complexity level: LOW     Pain Rating:      Activity Tolerance:   Fair, requires rest breaks, and SpO2 90% on RA post activity    After treatment patient left in no apparent distress:   Sitting in chair and Call bell within reach    COMMUNICATION/EDUCATION:   The patients plan of care was discussed with: Registered nurse. Fall prevention education was provided and the patient/caregiver indicated understanding., Patient/family have participated as able in goal setting and plan of care. , and Patient/family agree to work toward stated goals and plan of care.     Thank you for this referral.  Asa Raymundo, PT, DPT   Time Calculation: 21 mins

## 2022-12-09 ENCOUNTER — APPOINTMENT (OUTPATIENT)
Dept: NON INVASIVE DIAGNOSTICS | Age: 52
DRG: 177 | End: 2022-12-09
Attending: NURSE PRACTITIONER
Payer: OTHER GOVERNMENT

## 2022-12-09 LAB
ALBUMIN SERPL-MCNC: 2.3 G/DL (ref 3.5–5)
ALBUMIN/GLOB SERPL: 0.6 {RATIO} (ref 1.1–2.2)
ALP SERPL-CCNC: 50 U/L (ref 45–117)
ALT SERPL-CCNC: 19 U/L (ref 12–78)
ANION GAP SERPL CALC-SCNC: 10 MMOL/L (ref 5–15)
AST SERPL-CCNC: 16 U/L (ref 15–37)
BILIRUB SERPL-MCNC: 0.6 MG/DL (ref 0.2–1)
BUN SERPL-MCNC: 14 MG/DL (ref 6–20)
BUN/CREAT SERPL: 23 (ref 12–20)
CALCIUM SERPL-MCNC: 8.4 MG/DL (ref 8.5–10.1)
CHLORIDE SERPL-SCNC: 106 MMOL/L (ref 97–108)
CO2 SERPL-SCNC: 23 MMOL/L (ref 21–32)
CREAT SERPL-MCNC: 0.61 MG/DL (ref 0.7–1.3)
CRP SERPL-MCNC: 15.1 MG/DL (ref 0–0.6)
D DIMER PPP FEU-MCNC: 2.97 MG/L FEU (ref 0–0.65)
ERYTHROCYTE [DISTWIDTH] IN BLOOD BY AUTOMATED COUNT: 11.9 % (ref 11.5–14.5)
GLOBULIN SER CALC-MCNC: 4 G/DL (ref 2–4)
GLUCOSE BLD STRIP.AUTO-MCNC: 182 MG/DL (ref 65–117)
GLUCOSE BLD STRIP.AUTO-MCNC: 214 MG/DL (ref 65–117)
GLUCOSE BLD STRIP.AUTO-MCNC: 268 MG/DL (ref 65–117)
GLUCOSE BLD STRIP.AUTO-MCNC: 338 MG/DL (ref 65–117)
GLUCOSE SERPL-MCNC: 177 MG/DL (ref 65–100)
HCT VFR BLD AUTO: 41.4 % (ref 36.6–50.3)
HGB BLD-MCNC: 14.7 G/DL (ref 12.1–17)
LACTATE SERPL-SCNC: 1.4 MMOL/L (ref 0.4–2)
MAGNESIUM SERPL-MCNC: 1.9 MG/DL (ref 1.6–2.4)
MCH RBC QN AUTO: 29.5 PG (ref 26–34)
MCHC RBC AUTO-ENTMCNC: 35.5 G/DL (ref 30–36.5)
MCV RBC AUTO: 83.1 FL (ref 80–99)
NRBC # BLD: 0 K/UL (ref 0–0.01)
NRBC BLD-RTO: 0 PER 100 WBC
PHOSPHATE SERPL-MCNC: 1.9 MG/DL (ref 2.6–4.7)
PLATELET # BLD AUTO: 184 K/UL (ref 150–400)
PMV BLD AUTO: 10.3 FL (ref 8.9–12.9)
POTASSIUM SERPL-SCNC: 3.2 MMOL/L (ref 3.5–5.1)
PROT SERPL-MCNC: 6.3 G/DL (ref 6.4–8.2)
RBC # BLD AUTO: 4.98 M/UL (ref 4.1–5.7)
SERVICE CMNT-IMP: ABNORMAL
SODIUM SERPL-SCNC: 139 MMOL/L (ref 136–145)
WBC # BLD AUTO: 9.4 K/UL (ref 4.1–11.1)

## 2022-12-09 PROCEDURE — 84100 ASSAY OF PHOSPHORUS: CPT

## 2022-12-09 PROCEDURE — 74011250637 HC RX REV CODE- 250/637: Performed by: INTERNAL MEDICINE

## 2022-12-09 PROCEDURE — 85379 FIBRIN DEGRADATION QUANT: CPT

## 2022-12-09 PROCEDURE — 74011636637 HC RX REV CODE- 636/637: Performed by: INTERNAL MEDICINE

## 2022-12-09 PROCEDURE — 80053 COMPREHEN METABOLIC PANEL: CPT

## 2022-12-09 PROCEDURE — 74011000250 HC RX REV CODE- 250: Performed by: INTERNAL MEDICINE

## 2022-12-09 PROCEDURE — 86140 C-REACTIVE PROTEIN: CPT

## 2022-12-09 PROCEDURE — 74011250636 HC RX REV CODE- 250/636: Performed by: INTERNAL MEDICINE

## 2022-12-09 PROCEDURE — 65270000029 HC RM PRIVATE

## 2022-12-09 PROCEDURE — 82962 GLUCOSE BLOOD TEST: CPT

## 2022-12-09 PROCEDURE — 36415 COLL VENOUS BLD VENIPUNCTURE: CPT

## 2022-12-09 PROCEDURE — 83735 ASSAY OF MAGNESIUM: CPT

## 2022-12-09 PROCEDURE — 97116 GAIT TRAINING THERAPY: CPT

## 2022-12-09 PROCEDURE — 94761 N-INVAS EAR/PLS OXIMETRY MLT: CPT

## 2022-12-09 PROCEDURE — 83605 ASSAY OF LACTIC ACID: CPT

## 2022-12-09 PROCEDURE — 99231 SBSQ HOSP IP/OBS SF/LOW 25: CPT

## 2022-12-09 PROCEDURE — 85027 COMPLETE CBC AUTOMATED: CPT

## 2022-12-09 RX ORDER — POTASSIUM CHLORIDE 750 MG/1
40 TABLET, FILM COATED, EXTENDED RELEASE ORAL
Status: COMPLETED | OUTPATIENT
Start: 2022-12-09 | End: 2022-12-09

## 2022-12-09 RX ORDER — DEXAMETHASONE 6 MG/1
6 TABLET ORAL DAILY
Status: DISCONTINUED | OUTPATIENT
Start: 2022-12-10 | End: 2022-12-10 | Stop reason: HOSPADM

## 2022-12-09 RX ORDER — INSULIN GLARGINE 100 [IU]/ML
40 INJECTION, SOLUTION SUBCUTANEOUS DAILY
Status: DISCONTINUED | OUTPATIENT
Start: 2022-12-10 | End: 2022-12-10 | Stop reason: HOSPADM

## 2022-12-09 RX ADMIN — LISINOPRIL 5 MG: 5 TABLET ORAL at 08:12

## 2022-12-09 RX ADMIN — POTASSIUM PHOSPHATE, MONOBASIC AND POTASSIUM PHOSPHATE, DIBASIC: 224; 236 INJECTION, SOLUTION, CONCENTRATE INTRAVENOUS at 10:52

## 2022-12-09 RX ADMIN — Medication 7 UNITS: at 16:30

## 2022-12-09 RX ADMIN — ZINC SULFATE 220 MG (50 MG) CAPSULE 1 CAPSULE: CAPSULE at 08:13

## 2022-12-09 RX ADMIN — LABETALOL HYDROCHLORIDE 100 MG: 100 TABLET, FILM COATED ORAL at 08:13

## 2022-12-09 RX ADMIN — DILTIAZEM HYDROCHLORIDE 120 MG: 120 CAPSULE, COATED, EXTENDED RELEASE ORAL at 08:13

## 2022-12-09 RX ADMIN — Medication 10 ML: at 21:19

## 2022-12-09 RX ADMIN — INSULIN GLARGINE 28 UNITS: 100 INJECTION, SOLUTION SUBCUTANEOUS at 08:12

## 2022-12-09 RX ADMIN — GUAIFENESIN 600 MG: 600 TABLET ORAL at 21:19

## 2022-12-09 RX ADMIN — ENOXAPARIN SODIUM 40 MG: 100 INJECTION SUBCUTANEOUS at 08:12

## 2022-12-09 RX ADMIN — Medication 10 ML: at 05:43

## 2022-12-09 RX ADMIN — POTASSIUM CHLORIDE 40 MEQ: 750 TABLET, FILM COATED, EXTENDED RELEASE ORAL at 08:13

## 2022-12-09 RX ADMIN — LABETALOL HYDROCHLORIDE 100 MG: 100 TABLET, FILM COATED ORAL at 21:19

## 2022-12-09 RX ADMIN — OXYCODONE HYDROCHLORIDE AND ACETAMINOPHEN 500 MG: 500 TABLET ORAL at 08:12

## 2022-12-09 RX ADMIN — Medication 10 UNITS: at 12:00

## 2022-12-09 RX ADMIN — DIPHENHYDRAMINE HYDROCHLORIDE AND LIDOCAINE HYDROCHLORIDE AND ALUMINUM HYDROXIDE AND MAGNESIUM HYDRO 10 ML: KIT at 13:46

## 2022-12-09 RX ADMIN — GUAIFENESIN 600 MG: 600 TABLET ORAL at 08:13

## 2022-12-09 RX ADMIN — DEXAMETHASONE 6 MG: 6 TABLET ORAL at 08:12

## 2022-12-09 RX ADMIN — SODIUM CHLORIDE 75 ML/HR: 9 INJECTION, SOLUTION INTRAVENOUS at 21:19

## 2022-12-09 RX ADMIN — Medication 10 ML: at 14:00

## 2022-12-09 RX ADMIN — INSULIN HUMAN 28 UNITS: 100 INJECTION, SUSPENSION SUBCUTANEOUS at 10:51

## 2022-12-09 RX ADMIN — DIPHENHYDRAMINE HYDROCHLORIDE AND LIDOCAINE HYDROCHLORIDE AND ALUMINUM HYDROXIDE AND MAGNESIUM HYDRO 10 ML: KIT at 17:31

## 2022-12-09 NOTE — DIABETES MGMT
[facility]    CLINICAL NURSE SPECIALIST CONSULT     Initial Presentation   Annabel Ruiz is a 46 y.o. male admitted on 12/05/22 after experiencing hyperglycemia and extreme fatigue, as well as some nausea. LAB: , AG 37, CO2 9, eGFR 41, Na 130, WBC 12.6, positive urine ketones  Chest Xray:IMPRESSION  Mild basilar atelectasis. Covid positive       HX:   Past Medical History:   Diagnosis Date    Diabetes ([facility]     Hyperlipidemia     Hypertension         INITIAL DX:   DKA (diabetic ketoacidosis) ([facility] [E11.10]     Current Treatment     TX: IVF, Basal/mealtime/bolus insulin, DVT prophylaxis, BP management, steroid    Consulted by Provider for advanced diabetes nursing assessment and care for:   [x] Transitioning off Ranelle Stalling   [x] Inpatient management strategy  [x] Home management assessment  [] Survival skill education    Hospital Course   Clinical progress has been complicated by DKA. Diabetes History   Patient has 25 year history of diabetes. Was diagnosed \"in the 90s\" with labwork after having polyuria, polydipsia, and blurry vision. He states he was not hospitalized at that time. Was started on Metformin at that time and has been on ever since. Has also been taking Ozempic once a week, Lantus 50 units daily, Humalog 25 units with meals, and some kind of \"flozin\" (SGLT-2). Father and sister both have diabetes. Diabetes-related Medical History  Acute complications  DKA  Neurological complications  Peripheral neuropathy  Microvascular disease  Retinopathy  Macrovascular disease  CAD, TIAs    Diabetes Medication History  Key Antihyperglycemic Medications               metFORMIN (GLUMETZA) 1,000 mg TG24 24 hour tablet (Taking) Take 1,000 mg by mouth daily (with dinner). empagliflozin (Jardiance) 25 mg tablet (Taking) Take 12.5 mg by mouth daily.     insulin aspart U-100 (NOVOLOG) 100 unit/mL (3 mL) inpn (Taking) 20-25 Units by SubCUTAneous route Before breakfast, lunch, and dinner. insulin glargine (Lantus Solostar U-100 Insulin) 100 unit/mL (3 mL) inpn (Taking) 50 Units by SubCUTAneous route nightly. semaglutide (Ozempic) 1 mg/dose (2 mg/1.5 mL) sub-q pen (Taking) 1 mg by SubCUTAneous route every Tuesday. Diabetes self-management practices:   Eating pattern   Deferred at this time  Physical activity pattern   Was trying to walk 3 miles/week  Monitoring pattern   States he checks before each meal; BG range 110-220  Taking medications pattern  See above  Social determinants of health impacting diabetes self-management practices   Concerned that you need to know more about how to stay healthy with diabetes  Overall evaluation:    [x] Achieving A1c target with drug therapy & self-care practices: Awaiting A1c result    Subjective   \"I don't remember meeting you in the ICU. I've kind of been in a fog. \"  Objective   Physical exam  General Obese male sitting up in bed, no apparent distress  Neuro  Alert, oriented   Vital Signs Visit Vitals  BP (!) 162/90 (BP 1 Location: Right arm, BP Patient Position: At rest)   Pulse 91   Temp 97.9 °F (36.6 °C)   Resp 17   Wt 93.2 kg (205 lb 8.8 oz)   SpO2 92%     Skin  Warm and dry. Extremities No foot wounds    Diabetic foot exam:    Deferred    Laboratory  Recent Labs     12/09/22  0113 12/08/22  0531 12/07/22  0756 12/07/22  0240 12/07/22  0220 12/06/22  1356 12/06/22  1006   * 222* 205*   < >  --    < > 188*   AGAP 10 16* 11   < >  --    < > 17*   WBC 9.4 9.0  --   --  7.7  --  8.3   CREA 0.61* 0.58* 0.82   < >  --    < > 1.28   AST 16 22  --   --   --   --  19   ALT 19 18  --   --   --   --  17    < > = values in this interval not displayed.          Factors impacting BG management  Factor Dose Comments   Nutrition:  Standard meals     75 gram/meals    Other:   Kidney function  Liver function  Hyperlipidemia   Normal  Normal      Blood glucose pattern      Significant diabetes-related events over the past 24-72 hours  Admission     Continuing on steroids this am  Assessment and Plan   Nursing Diagnosis Risk for unstable blood glucose pattern   Nursing Intervention Domain 2195 Decision-making Support   Nursing Interventions Examined current inpatient diabetes/blood glucose control   Explored factors facilitating and impeding inpatient management  Explored corrective strategies with patient and responsible inpatient provider   Informed patient of rational for insulin strategy while hospitalized       Evaluation   This 46 y.o. male was admitted in DKA and is positive for Covid. Per chart, he reportedly is Type 1.5 and has had diabetes for about 25 years. He is followed by pharmacological and diabetes team at the [facility]. He states he has been taking his medications regularly, but states he got so sick recently, that he wasn't eating and  just couldn't function enough to take his insulin. This point, on top of cytokine release with Covid, sent him into DKA. Patient out of ICU this morning, looking and feeling much better. He does c/o some esophageal discomfort when he eats things that are acidic, so I advised him to avoid those foods. He was started on steroid yesterday and received NPH dose to override BG spike. His FBG was 214 this am. He is eating some, but not all of his food. Given the amount of insulin he takes at home, his improvement in condition, elevated inflammatory markers, and the fact that he received 15 units of correctional yesterday, he could likely tolerate an increase in his basal insulin to a 0.4 unit/kg/D dose to get BG down to target goal.    This patient would benefit from diabetes self-management education and support Y[facility]ILET MARQUE[facility]) after discharge.     Recommendations     [x] Use of Subcutaneous Insulin Order set (2493)  Insulin Dosing Specific recommendation   Basal                                      (Based on weight, BMI & GFR)   [x] 0.4 units/kg/D (for diabetes )  0.3 unit/kg dose with each dose of steroid (see chart below)   40 units Lantus daily      28 units NPH linked with steroid   Nutritional                                      (Based on CHO/dextrose load) [] Normal sensitivity  [x] Insulin-resistant sensitivity 6 units Humalog with each consumed meal   Corrective                                       (Useful in adjusting insulin dosing) [] Normal sensitivity  [] HIGH sensitivity  [x] Insulin-resistant sensitivity    In addressing the expected hyperglycemic effect of differing steroids, weight-based insulin dosing may be entertained using is the following table:    Steroid Equivalency for insulin dosing   Insulin dosing Dexamethasone (20-36 hour impact) Methylprednisolone (12-16 hour impact) Prednisone       (12-16 hour impact)   0.4 units/kg > 8mg 36 mg + 40mg   0.3/units/kg 6 mg 24 mg 30mg   0.2/units/kg 4 mg 16 mg 20mg   0.1 units/kg 2 mg 8 mg 10mg       Discharge Recommendations   POC BG checks AC&HS  Restart home doses of Lantus 50 units daily, Novolog 20 units with meals, Metformin, Jardiance, and Ozempic  Follow up with diabetes team at the [facility] Billing Code(s)   [] [id number] IP subsequent hospital care - 35 minutes [] [id number] Prolonged Services - 65 minutes [] [id number] Prolonged Services - 110 minutes  [] [id number] IP subsequent hospital care - 25 minutes [] 86104 Prolonged Services - 55 minutes [] 77837 Prolonged Services - 100 minutes  [x] [id number] IP subsequent hospital care - 15 minutes [] 83959 Prolonged Services - 45 minutes [] 91596 Prolonged Services - 90 minutes    Before making these care recommendations, I personally reviewed the hospitalization record, including notes, laboratory & diagnostic data and current medications, and examined the patient at the bedside (circumstances permitting) before making care recommendations. More than fifty (50) percent of the time was spent in patient counseling and/or care coordination.   Total minutes: 15    Nishant Barbosa, CNS  Diabetes Clinical Nurse Specialist  [facility]  Access via [address] [place] [address]

## 2022-12-09 NOTE — PROGRESS NOTES
[facility]  [facility], [address] [place] [address]  [phone]      Hospitalist Progress Note      NAME: Janak Dozier   :  1970  MRM:  [id number]    Date/Time of service: 2022  12:04 PM       Subjective:     Chief Complaint:  Patient was personally seen and examined by me during this time period. Chart reviewed. Still weak, unsteady with ambulation. C/o mouth sores       Objective:       Vitals:       Last 24hrs VS reviewed since prior progress note.  Most recent are:    Visit Vitals  /73 (BP 1 Location: Right arm, BP Patient Position: At rest;Sitting)   Pulse 87   Temp 97.5 °F (36.4 °C)   Resp 16   Wt 93.2 kg (205 lb 8.8 oz)   SpO2 92%     SpO2 Readings from Last 6 Encounters:   22 92%    O2 Flow Rate (L/min): 2 l/min     Intake/Output Summary (Last 24 hours) at 2022 1204  Last data filed at 2022 0754  Gross per 24 hour   Intake 500 ml   Output 2700 ml   Net -2200 ml          Exam:     Physical Exam:    Gen:  frail, ill-appearing, NAD  HEENT:  Pink conjunctivae, PERRL, hearing intact to voice, oral athous ulcers  Neck:  Supple, without masses, thyroid non-tender  Resp:  No accessory muscle use, scattered rhonchi   Card:  No murmurs, normal S1, S2 without thrills, bruits or peripheral edema  Abd:  Soft, non-tender, non-distended, normoactive bowel sounds are present  Musc:  No cyanosis or clubbing  Skin:  No rashes   Neuro:  Cranial nerves 3-12 are grossly intact, follows commands appropriately  Psych:  Good insight, oriented to person, place and time, alert    Medications Reviewed: (see below)    Lab Data Reviewed: (see below)    ______________________________________________________________________    Medications:     Current Facility-Administered Medications   Medication Dose Route Frequency    potassium phosphate 30 mmol in 0.9% sodium chloride 250 mL infusion   IntraVENous ONCE    [START ON 12/10/2022] insulin glargine (LANTUS) injection 40 Units  40 Units SubCUTAneous DAILY    [START ON 12/10/2022] insulin NPH (NOVOLIN N, HUMULIN N) injection 28 Units  28 Units SubCUTAneous DAILY    And    [START ON 12/10/2022] dexAMETHasone (DECADRON) tablet 6 mg  6 mg Oral DAILY    miracle-trivalent mouthwash  10 mL Oral Q6H    0.9% sodium chloride infusion  75 mL/hr IntraVENous CONTINUOUS    labetaloL (NORMODYNE) tablet 100 mg  100 mg Oral Q12H    insulin lispro (HUMALOG) injection 6 Units  6 Units SubCUTAneous TIDAC    guaiFENesin ER (MUCINEX) tablet 600 mg  600 mg Oral Q12H    albuterol-ipratropium (DUO-NEB) 2.5 MG-0.5 MG/3 ML  3 mL Nebulization Q6H PRN    phenol throat spray (CHLORASEPTIC) 1 Spray  1 Spray Oral Q2H PRN    glucose chewable tablet 16 g  4 Tablet Oral PRN    glucagon (GLUCAGEN) injection 1 mg  1 mg IntraMUSCular PRN    lisinopriL (PRINIVIL, ZESTRIL) tablet 5 mg  5 mg Oral DAILY    insulin lispro (HUMALOG) injection   SubCUTAneous AC&HS    dextrose 10% infusion 0-250 mL  0-250 mL IntraVENous PRN    dilTIAZem ER (CARDIZEM CD) capsule 120 mg  120 mg Oral DAILY    labetaloL (NORMODYNE;TRANDATE) 20 mg/4 mL (5 mg/mL) injection 20 mg  20 mg IntraVENous Q4H PRN    ascorbic acid (vitamin C) (VITAMIN C) tablet 500 mg  500 mg Oral DAILY    zinc sulfate (ZINCATE) 50 mg zinc (220 mg) capsule 1 Capsule  1 Capsule Oral DAILY    benzonatate (TESSALON) capsule 100 mg  100 mg Oral TID PRN    sodium chloride (NS) flush 5-10 mL  5-10 mL IntraVENous PRN    sodium chloride (NS) flush 5-40 mL  5-40 mL IntraVENous Q8H    sodium chloride (NS) flush 5-40 mL  5-40 mL IntraVENous PRN    acetaminophen (TYLENOL) tablet 650 mg  650 mg Oral Q6H PRN    Or    acetaminophen (TYLENOL) suppository 650 mg  650 mg Rectal Q6H PRN    polyethylene glycol (MIRALAX) packet 17 g  17 g Oral DAILY PRN    ondansetron (ZOFRAN ODT) tablet 4 mg  4 mg Oral Q8H PRN    Or    ondansetron (ZOFRAN) injection 4 mg  4 mg IntraVENous Q6H PRN    enoxaparin (LOVENOX) injection 40 mg  40 mg SubCUTAneous DAILY          Lab Review:     Recent Labs     12/09/22  0113 12/08/22  0531 12/07/22  0220   WBC 9.4 9.0 7.7   HGB 14.7 15.3 17.0   HCT 41.4 43.5 48.4    171 177       Recent Labs     12/09/22  0113 12/08/22  0531 12/07/22  0756    137 139   K 3.2* 3.3* 3.2*    102 105   CO2 23 19* 23   * 222* 205*   BUN 14 18 20   CREA 0.61* 0.58* 0.82   CA 8.4* 8.1* 8.4*   MG 1.9 1.9 2.1   PHOS 1.9* 2.1*  --    ALB 2.3* 2.4*  --    TBILI 0.6 0.7  --    ALT 19 18  --        Lab Results   Component Value Date/Time    Glucose (POC) 338 (H) 12/09/2022 11:45 AM    Glucose (POC) 214 (H) 12/09/2022 07:52 AM    Glucose (POC) 202 (H) 12/08/2022 08:57 PM    Glucose (POC) 189 (H) 12/08/2022 04:42 PM    Glucose (POC) 190 (H) 12/08/2022 11:20 AM          Assessment / Plan:     47 yo hx of HTN, DM type 1, presented w/ DKA, SVT, COVID infection    1) DM type 1 w/ DKA, uncontrolled: A1C 9.9%. DKA resolved, likely triggered by COVID. Will cont IVF. Cont Lantus, JUAN, SSI. Use NPH with steroids    2) COVID-19 infection/pneumonia: has elevated inflammatory labs on admission. Will cont dexamethasone, Vit C/Zinc.  Monitor inflammatory labs    3) SVT: due to COVID. Cont oral dilt. Cards following    4) HTN: now improving. Will cont oral labetalol, dilt, lisinopril.   Use IV labetalol prn    5) Weakness: PT/OT following, recommended HH    6) HypoK/phos: replete IV, monitor     Total time spent with patient care: 35 Minutes **I personally saw and examined the patient during this time period**                 Care Plan discussed with: Patient, nursing     Discussed:  Care Plan    Prophylaxis:  Lovenox    Disposition:  Home w/Family           ___________________________________________________    Attending Physician: Tracey Westfall MD

## 2022-12-09 NOTE — PROGRESS NOTES
Problem: Mobility Impaired (Adult and Pediatric)  Goal: *Acute Goals and Plan of Care (Insert Text)  Description: FUNCTIONAL STATUS PRIOR TO ADMISSION: Patient was independent and active without use of DME.    HOME SUPPORT PRIOR TO ADMISSION: The patient lived with his wife and father in-law. The patient was the primary caregiver and tended to household ADLs. Physical Therapy Goals  Initiated 12/8/2022  1. Patient will move from supine to sit and sit to supine  in bed with independence within 7 day(s). 2.  Patient will transfer from bed to chair and chair to bed with modified independence using the least restrictive device within 7 day(s). 3.  Patient will perform sit to stand with modified independence within 7 day(s). 4.  Patient will ambulate with modified independence for 150 feet with the least restrictive device within 7 day(s). Outcome: Progressing Towards Goal   PHYSICAL THERAPY TREATMENT  Patient: Dave Mcelroy (89 y.o. male)  Date: 12/9/2022  Diagnosis: [facility]abetic [facility] [E11.10] <principal problem not specified>      Precautions:    Chart, physical therapy assessment, plan of care and goals were reviewed. ASSESSMENT  Patient continues with skilled PT services and is progressing towards goals. Patient demonstrates improved independence in  transfers and overall activity tolerance today. Completed 2 trials x25' each using the RW initially and weaning to independent for the 2nd trial. The walker offered only mild improvement in gait quality but slowed his aamir. Noted  and occasional reaching for external support on 2nd trial without DME but no LOB and improved aamir. The patient prefers mobility without DME and will work to wean to no DME going forward. He remains below his independent baseline for mobility and continue to recommend HHPT.     Current Level of Function Impacting Discharge (mobility/balance): CGA-SBA for gait      Other factors to consider for discharge: primary caregiver for his wife and father in-law         PLAN :  Patient continues to benefit from skilled intervention to address the above impairments. Continue treatment per established plan of care. to address goals. Recommendation for discharge: (in order for the patient to meet his/her long term goals)  Physical therapy at least 2 days/week in the home     This discharge recommendation:  Has been made in collaboration with the attending provider and/or case management    IF patient discharges home will need the following DME: none       SUBJECTIVE:   Patient stated I feel about 80% today.     OBJECTIVE DATA SUMMARY:   Critical Behavior:  Neurologic State: Alert  Orientation Level: Oriented X4  Cognition: Follows commands     Functional Mobility Training:  Bed Mobility:           Scooting: Stand-by assistance        Transfers:  Sit to Stand: Contact guard assistance  Stand to Sit: Contact guard assistance                             Balance:  Sitting: Intact  Standing: Impaired  Standing - Static: Good  Standing - Dynamic : Fair;Good  Ambulation/Gait Training:  Distance (ft): 25 Feet (ft) (x2)              Gait Abnormalities: Trunk sway increased              Speed/Jenni: Slow;Pace decreased (<100 feet/min)                       Stairs: Therapeutic Exercises:     Pain Rating:      Activity Tolerance:   Fair    After treatment patient left in no apparent distress:   Supine in bed and Call bell within reach    COMMUNICATION/COLLABORATION:   The patients plan of care was discussed with: Registered nurse.      Isma Lam PT, DPT   Time Calculation: 13 mins

## 2022-12-09 NOTE — PROGRESS NOTES
Bedside and Verbal shift change report given to [address] (oncoming nurse) by Fabian Dandy RN (offgoing nurse). Report included the following information SBAR, Procedure Summary, Intake/Output, and MAR.

## 2022-12-09 NOTE — PROGRESS NOTES
12/9/2022  3:27 PM  Pt discussed in IDR is continuing to require medical management for DM type 1 w/ DKA, COVID-19 infection/pneumonia, SVT, HTN, WEakness, Hypo K/phos  Transitions of Care Plan:  COVID  19+   RUR 8 % Low Risk of Readmission/Green  LOS 4 Days  Medical management continues  COVID 19+ has elevated inflammatory labs on admission, on RA   Cardiology following   PT,OT following the recommendation is for [facility] and orders and clinicals have been faxed to [facility] [phone]  Pt will need hard Rx for all new medications to take to [address] when stable to home w/ [facility]  Outpatient follow up PCP, [address]  Family will transport at DC   CM will continue to follow and assist w/ DC needs   Mine Kiran, HECTOR

## 2022-12-09 NOTE — PROGRESS NOTES
Spiritual Care Assessment/Progress Note  [address]      NAME: Ashley Brantley      MRN: [id number]  AGE: 46 y.o. SEX: male  Jew Affiliation: Oriental orthodox   Language: English     12/9/2022     Total Time (in minutes): 15     Spiritual Assessment begun in [facility]  MED SURG 2 through conversation with:         []Patient        [] Family    [] Friend(s)        Reason for Consult: Initial/Spiritual assessment, patient floor     Spiritual beliefs: (Please include comment if needed)     [] Identifies with a channing tradition:    Oriental orthodox on file      [] Supported by a channing community:            [] Claims no spiritual orientation:           [] Seeking spiritual identity:                [] Adheres to an individual form of spirituality:           [x] Not able to assess:                           Identified resources for coping:      [] Prayer                               [] Music                  [] Guided Imagery     [] Family/friends                 [] Pet visits     [] Devotional reading                         [x] Unknown     [] Other:                                               Interventions offered during this visit: (See comments for more details)                Plan of Care:     [] Support spiritual and/or cultural needs    [] Support AMD and/or advance care planning process      [] Support grieving process   [] Coordinate Rites and/or Rituals    [] Coordination with community clergy   [] No spiritual needs identified at this time   [] Detailed Plan of Care below (See Comments)  [] Make referral to Music Therapy  [] Make referral to Pet Therapy     [] Make referral to Addiction services  [] Make referral to [facility]  [] Make referral to Spiritual Care Partner  [] No future visits requested        [x] Contact Spiritual Care for further referrals     Comments:   Spiritual care assessment for Mr. Ashley Brantley on 5M 2 med surg unit. Pt is under contact precautions.  Consulted with attending RN who shared that Pt is sitting in room chair and can talk by phone if need be. That the care plan is for discharge as soon as tomorrow. Advised [facility].     Chaplain Kathryn Ramsay M.Div.  [phone])

## 2022-12-10 ENCOUNTER — APPOINTMENT (OUTPATIENT)
Dept: NON INVASIVE DIAGNOSTICS | Age: 52
DRG: 177 | End: 2022-12-10
Attending: NURSE PRACTITIONER
Payer: OTHER GOVERNMENT

## 2022-12-10 VITALS
OXYGEN SATURATION: 93 % | HEIGHT: 71 IN | SYSTOLIC BLOOD PRESSURE: 120 MMHG | RESPIRATION RATE: 18 BRPM | HEART RATE: 87 BPM | WEIGHT: 205.47 LBS | TEMPERATURE: 97.9 F | BODY MASS INDEX: 28.77 KG/M2 | DIASTOLIC BLOOD PRESSURE: 70 MMHG

## 2022-12-10 PROBLEM — J12.82 PNEUMONIA DUE TO COVID-19 VIRUS: Status: ACTIVE | Noted: 2022-12-10

## 2022-12-10 PROBLEM — I47.1 SVT (SUPRAVENTRICULAR TACHYCARDIA) (HCC): Status: ACTIVE | Noted: 2022-12-10

## 2022-12-10 PROBLEM — U07.1 PNEUMONIA DUE TO COVID-19 VIRUS: Status: ACTIVE | Noted: 2022-12-10

## 2022-12-10 LAB
ALBUMIN SERPL-MCNC: 2.3 G/DL (ref 3.5–5)
ALBUMIN/GLOB SERPL: 0.6 {RATIO} (ref 1.1–2.2)
ALP SERPL-CCNC: 53 U/L (ref 45–117)
ALT SERPL-CCNC: 20 U/L (ref 12–78)
ANION GAP SERPL CALC-SCNC: 12 MMOL/L (ref 5–15)
AST SERPL-CCNC: 15 U/L (ref 15–37)
BILIRUB SERPL-MCNC: 0.6 MG/DL (ref 0.2–1)
BUN SERPL-MCNC: 12 MG/DL (ref 6–20)
BUN/CREAT SERPL: 22 (ref 12–20)
CALCIUM SERPL-MCNC: 8.3 MG/DL (ref 8.5–10.1)
CHLORIDE SERPL-SCNC: 104 MMOL/L (ref 97–108)
CO2 SERPL-SCNC: 23 MMOL/L (ref 21–32)
CREAT SERPL-MCNC: 0.54 MG/DL (ref 0.7–1.3)
CRP SERPL-MCNC: 8.82 MG/DL (ref 0–0.6)
D DIMER PPP FEU-MCNC: 4.14 MG/L FEU (ref 0–0.65)
ECHO AV AREA PEAK VELOCITY: 3.1 CM2
ECHO AV AREA/BSA PEAK VELOCITY: 1.5 CM2/M2
ECHO AV PEAK GRADIENT: 5 MMHG
ECHO AV PEAK VELOCITY: 1.1 M/S
ECHO AV VELOCITY RATIO: 0.73
ECHO LA DIAMETER INDEX: 1.75 CM/M2
ECHO LA DIAMETER: 3.7 CM
ECHO LA VOL 2C: 39 ML (ref 18–58)
ECHO LA VOL 4C: 25 ML (ref 18–58)
ECHO LA VOL BP: 32 ML (ref 18–58)
ECHO LA VOL/BSA BIPLANE: 15 ML/M2 (ref 16–34)
ECHO LA VOLUME AREA LENGTH: 35 ML
ECHO LA VOLUME INDEX A2C: 18 ML/M2 (ref 16–34)
ECHO LA VOLUME INDEX A4C: 12 ML/M2 (ref 16–34)
ECHO LA VOLUME INDEX AREA LENGTH: 17 ML/M2 (ref 16–34)
ECHO LV EDV A2C: 94 ML
ECHO LV EDV A4C: 77 ML
ECHO LV EDV BP: 88 ML (ref 67–155)
ECHO LV EDV INDEX A4C: 36 ML/M2
ECHO LV EDV INDEX BP: 42 ML/M2
ECHO LV EDV NDEX A2C: 44 ML/M2
ECHO LV EJECTION FRACTION A2C: 53 %
ECHO LV EJECTION FRACTION A4C: 52 %
ECHO LV ESV A2C: 44 ML
ECHO LV ESV A4C: 37 ML
ECHO LV ESV BP: 41 ML (ref 22–58)
ECHO LV ESV INDEX A2C: 21 ML/M2
ECHO LV ESV INDEX A4C: 17 ML/M2
ECHO LV ESV INDEX BP: 19 ML/M2
ECHO LV FRACTIONAL SHORTENING: 31 % (ref 28–44)
ECHO LV INTERNAL DIMENSION DIASTOLE INDEX: 2.26 CM/M2
ECHO LV INTERNAL DIMENSION DIASTOLIC: 4.8 CM (ref 4.2–5.9)
ECHO LV INTERNAL DIMENSION SYSTOLIC INDEX: 1.56 CM/M2
ECHO LV INTERNAL DIMENSION SYSTOLIC: 3.3 CM
ECHO LV IVSD: 0.7 CM (ref 0.6–1)
ECHO LV MASS 2D: 106.9 G (ref 88–224)
ECHO LV MASS INDEX 2D: 50.4 G/M2 (ref 49–115)
ECHO LV POSTERIOR WALL DIASTOLIC: 0.7 CM (ref 0.6–1)
ECHO LV RELATIVE WALL THICKNESS RATIO: 0.29
ECHO LVOT AREA: 4.5 CM2
ECHO LVOT DIAM: 2.4 CM
ECHO LVOT PEAK GRADIENT: 2 MMHG
ECHO LVOT PEAK VELOCITY: 0.8 M/S
ECHO RV INTERNAL DIMENSION: 3.6 CM
ECHO RV TAPSE: 1.7 CM (ref 1.7–?)
ECHO TV REGURGITANT MAX VELOCITY: 2.25 M/S
ECHO TV REGURGITANT PEAK GRADIENT: 20 MMHG
GLOBULIN SER CALC-MCNC: 4.1 G/DL (ref 2–4)
GLUCOSE BLD STRIP.AUTO-MCNC: 190 MG/DL (ref 65–117)
GLUCOSE BLD STRIP.AUTO-MCNC: 208 MG/DL (ref 65–117)
GLUCOSE SERPL-MCNC: 204 MG/DL (ref 65–100)
MAGNESIUM SERPL-MCNC: 1.8 MG/DL (ref 1.6–2.4)
PHOSPHATE SERPL-MCNC: 2.9 MG/DL (ref 2.6–4.7)
POTASSIUM SERPL-SCNC: 3.2 MMOL/L (ref 3.5–5.1)
PROT SERPL-MCNC: 6.4 G/DL (ref 6.4–8.2)
SERVICE CMNT-IMP: ABNORMAL
SERVICE CMNT-IMP: ABNORMAL
SODIUM SERPL-SCNC: 139 MMOL/L (ref 136–145)

## 2022-12-10 PROCEDURE — 74011250637 HC RX REV CODE- 250/637: Performed by: INTERNAL MEDICINE

## 2022-12-10 PROCEDURE — 84100 ASSAY OF PHOSPHORUS: CPT

## 2022-12-10 PROCEDURE — 85379 FIBRIN DEGRADATION QUANT: CPT

## 2022-12-10 PROCEDURE — 80053 COMPREHEN METABOLIC PANEL: CPT

## 2022-12-10 PROCEDURE — 74011000250 HC RX REV CODE- 250: Performed by: INTERNAL MEDICINE

## 2022-12-10 PROCEDURE — 93306 TTE W/DOPPLER COMPLETE: CPT

## 2022-12-10 PROCEDURE — 86140 C-REACTIVE PROTEIN: CPT

## 2022-12-10 PROCEDURE — 36415 COLL VENOUS BLD VENIPUNCTURE: CPT

## 2022-12-10 PROCEDURE — 74011636637 HC RX REV CODE- 636/637: Performed by: INTERNAL MEDICINE

## 2022-12-10 PROCEDURE — 83735 ASSAY OF MAGNESIUM: CPT

## 2022-12-10 PROCEDURE — 74011250636 HC RX REV CODE- 250/636: Performed by: INTERNAL MEDICINE

## 2022-12-10 PROCEDURE — 93306 TTE W/DOPPLER COMPLETE: CPT | Performed by: INTERNAL MEDICINE

## 2022-12-10 PROCEDURE — 94761 N-INVAS EAR/PLS OXIMETRY MLT: CPT

## 2022-12-10 PROCEDURE — 82962 GLUCOSE BLOOD TEST: CPT

## 2022-12-10 RX ORDER — POTASSIUM CHLORIDE 750 MG/1
40 TABLET, FILM COATED, EXTENDED RELEASE ORAL 2 TIMES DAILY
Status: DISCONTINUED | OUTPATIENT
Start: 2022-12-10 | End: 2022-12-10 | Stop reason: HOSPADM

## 2022-12-10 RX ORDER — ALBUTEROL SULFATE 90 UG/1
2 AEROSOL, METERED RESPIRATORY (INHALATION)
Qty: 18 G | Refills: 0 | Status: SHIPPED | OUTPATIENT
Start: 2022-12-10

## 2022-12-10 RX ORDER — DILTIAZEM HYDROCHLORIDE 120 MG/1
120 CAPSULE, COATED, EXTENDED RELEASE ORAL DAILY
Qty: 30 CAPSULE | Refills: 0 | Status: SHIPPED | OUTPATIENT
Start: 2022-12-11

## 2022-12-10 RX ORDER — LISINOPRIL 5 MG/1
5 TABLET ORAL DAILY
Qty: 30 TABLET | Refills: 0 | Status: SHIPPED | OUTPATIENT
Start: 2022-12-11

## 2022-12-10 RX ORDER — ZINC SULFATE 50(220)MG
1 CAPSULE ORAL DAILY
Qty: 30 CAPSULE | Refills: 0 | Status: SHIPPED | OUTPATIENT
Start: 2022-12-11

## 2022-12-10 RX ORDER — BENZONATATE 100 MG/1
100 CAPSULE ORAL
Qty: 30 CAPSULE | Refills: 0 | Status: SHIPPED | OUTPATIENT
Start: 2022-12-10

## 2022-12-10 RX ORDER — GUAIFENESIN 600 MG/1
600 TABLET, EXTENDED RELEASE ORAL EVERY 12 HOURS
Qty: 30 TABLET | Refills: 0 | Status: SHIPPED | OUTPATIENT
Start: 2022-12-10

## 2022-12-10 RX ORDER — LABETALOL 100 MG/1
100 TABLET, FILM COATED ORAL EVERY 12 HOURS
Qty: 60 TABLET | Refills: 0 | Status: SHIPPED | OUTPATIENT
Start: 2022-12-10

## 2022-12-10 RX ORDER — ASCORBIC ACID 500 MG
500 TABLET ORAL DAILY
Qty: 30 TABLET | Refills: 0 | Status: SHIPPED | OUTPATIENT
Start: 2022-12-11

## 2022-12-10 RX ORDER — DEXAMETHASONE 6 MG/1
6 TABLET ORAL
Qty: 3 TABLET | Refills: 0 | Status: SHIPPED | OUTPATIENT
Start: 2022-12-10 | End: 2022-12-13

## 2022-12-10 RX ADMIN — INSULIN GLARGINE 40 UNITS: 100 INJECTION, SOLUTION SUBCUTANEOUS at 08:58

## 2022-12-10 RX ADMIN — GUAIFENESIN 600 MG: 600 TABLET ORAL at 08:59

## 2022-12-10 RX ADMIN — INSULIN LISPRO 6 UNITS: 100 INJECTION, SOLUTION INTRAVENOUS; SUBCUTANEOUS at 13:04

## 2022-12-10 RX ADMIN — DIPHENHYDRAMINE HYDROCHLORIDE AND LIDOCAINE HYDROCHLORIDE AND ALUMINUM HYDROXIDE AND MAGNESIUM HYDRO 10 ML: KIT at 00:16

## 2022-12-10 RX ADMIN — Medication 3 UNITS: at 13:05

## 2022-12-10 RX ADMIN — INSULIN HUMAN 28 UNITS: 100 INJECTION, SUSPENSION SUBCUTANEOUS at 08:58

## 2022-12-10 RX ADMIN — DIPHENHYDRAMINE HYDROCHLORIDE AND LIDOCAINE HYDROCHLORIDE AND ALUMINUM HYDROXIDE AND MAGNESIUM HYDRO 10 ML: KIT at 06:28

## 2022-12-10 RX ADMIN — DIPHENHYDRAMINE HYDROCHLORIDE AND LIDOCAINE HYDROCHLORIDE AND ALUMINUM HYDROXIDE AND MAGNESIUM HYDRO 10 ML: KIT at 13:04

## 2022-12-10 RX ADMIN — LISINOPRIL 5 MG: 5 TABLET ORAL at 09:00

## 2022-12-10 RX ADMIN — POTASSIUM CHLORIDE 40 MEQ: 750 TABLET, FILM COATED, EXTENDED RELEASE ORAL at 13:04

## 2022-12-10 RX ADMIN — ENOXAPARIN SODIUM 40 MG: 100 INJECTION SUBCUTANEOUS at 08:59

## 2022-12-10 RX ADMIN — ZINC SULFATE 220 MG (50 MG) CAPSULE 1 CAPSULE: CAPSULE at 09:01

## 2022-12-10 RX ADMIN — DILTIAZEM HYDROCHLORIDE 120 MG: 120 CAPSULE, COATED, EXTENDED RELEASE ORAL at 09:00

## 2022-12-10 RX ADMIN — LABETALOL HYDROCHLORIDE 100 MG: 100 TABLET, FILM COATED ORAL at 09:01

## 2022-12-10 RX ADMIN — OXYCODONE HYDROCHLORIDE AND ACETAMINOPHEN 500 MG: 500 TABLET ORAL at 09:00

## 2022-12-10 RX ADMIN — DEXAMETHASONE 6 MG: 6 TABLET ORAL at 09:00

## 2022-12-10 RX ADMIN — Medication 10 ML: at 06:28

## 2022-12-10 RX ADMIN — INSULIN LISPRO 6 UNITS: 100 INJECTION, SOLUTION INTRAVENOUS; SUBCUTANEOUS at 08:59

## 2022-12-10 RX ADMIN — Medication 4 UNITS: at 09:01

## 2022-12-10 NOTE — DISCHARGE INSTRUCTIONS
HOSPITALIST DISCHARGE INSTRUCTIONS  NAME: Ericka Sabillon   :  1970   MRN:  [id number]     Date/Time:  12/10/2022 11:11 AM    ADMIT DATE: 2022     DISCHARGE DATE: 12/10/2022     ADMITTING DIAGNOSIS:  DKA, COVID, SVT    DISCHARGE DIAGNOSIS:  same    MEDICATIONS:  See after visit summary       It is important that you take the medication exactly as they are prescribed. Keep your medication in the bottles provided by the pharmacist and keep a list of the medication names, dosages, and times to be taken in your wallet. Do not take other medications without consulting your doctor     Pain Management: per above medications    What to do at Home    Recommended diet:  Diabetic Diet    Recommended activity: Activity as tolerated    1) Return to the hospital if you feel worse    2) If you experience any of the following symptoms then please call your primary care physician or return to the emergency room if you cannot get hold of your doctor:  Fever, chills, nausea, vomiting, diarrhea, change in mentation, falling, bleeding, shortness of breath, chest pain, severe headache, severe abdominal pain,     3) Follow up with your primary care doctor     Follow Up:  [place] [address]  [phone]  Schedule an appointment as soon as possible for a visit in 1 week(s)    . Information obtained by :  I understand that if any problems occur once I am at home I am to contact my physician. I understand and acknowledge receipt of the instructions indicated above.                                                                                                                                            Physician's or R.N.'s Signature                                                                  Date/Time                                                                                                                                              Patient or Representative Signature                                                          Date/Time          Diabetic Ketoacidosis (DKA): Care Instructions  Overview  Diabetic ketoacidosis (DKA) happens when the body does not have enough insulin and can't get the sugar it needs for energy. When the body can't use sugar for energy, it starts to use fat for energy. This process makes fatty acids called ketones. The ketones build up in the blood and change the chemical balance in your body. This problem can be very dangerous and needs to be treated. Without treatment, it can lead to a coma or death. DKA occurs most often in people with type 1 diabetes. But people with type 2 diabetes also can get it. DKA can be caused by many things. It can happen if you don't take enough insulin. It can also happen if you have an infection or illness like the flu. Sometimes it happens if you are very dehydrated. DKA can only be treated with insulin and fluids. These are often given in a vein (IV). Follow-up care is a key part of your treatment and safety. Be sure to make and go to all appointments, and call your doctor if you are having problems. It's also a good idea to know your test results and keep a list of the medicines you take. How can you care for yourself at home? To reduce your chance of ketoacidosis:  Take your insulin and other diabetes medicines on time and in the right dose. If an infection caused your DKA and your doctor prescribed antibiotics, take them as directed. Do not stop taking them just because you feel better. You need to take the full course of antibiotics. Test your blood sugar before meals and at bedtime or as often as your doctor advises. This is the best way to know when your blood sugar is high so you can treat it early. Watching for symptoms is not as helpful. This is because you may not have symptoms until your blood sugar is very high. Or you may not notice them.   Teach others at work and at home how to check your blood sugar. Make sure that someone else knows how do it in case you can't. Wear or carry medical identification at all times. This is very important in case you are too sick or injured to speak for yourself. Talk to your doctor about when you can start to exercise again. Eat regular meals that spread your calories and carbohydrate throughout the day. This will help keep your blood sugar steady. When you are sick:  Take your insulin and diabetes medicines. This is important even if you are vomiting and having trouble eating or drinking. Your blood sugar may go up because you are sick. If you are eating less than normal, you may need to change your dose of insulin. Talk with your doctor about a plan when you are well. Then you will know what to do when you are sick. Drink extra fluids to prevent dehydration. These include water, broth, and sugar-free drinks. If you don't drink enough, the insulin from your shot may not get into your blood. So your blood sugar may go up. Try to eat as you normally do, with a focus on healthy food choices. Check your blood sugar at least every 3 to 4 hours. Check it more often if it's rising fast. If your doctor has told you to take an extra insulin dose for high blood sugar levels (for example, above 240 mg/dL) be sure to take the right amount. If you're not sure how much to take, call your doctor. Check your temperature and pulse often. If your temperature goes up, call your doctor. You may be getting worse. If you take insulin, check your urine or blood for ketones, especially when you have high blood sugar (for example, above 240 mg/dL). Call your doctor if your ketone level is moderate or high. If you know your blood sugar is high, treat it before it gets worse. If you missed your usual dose of insulin or other diabetes medicine, take the missed dose or take the amount your doctor told you to take if this happens.   If you and your doctor decide on a dose of extra-fast-acting insulin, give yourself the right dose. If you take insulin and your doctor has not told you how much fast-acting insulin to take based on your blood sugar level, call your doctor. Drink extra water or sugar-free drinks to prevent dehydration. Wait 30 minutes after you take extra insulin or missed medicines. Then check your blood sugar again. If symptoms of high blood sugar get worse or your blood sugar level keeps rising, call your doctor. If you start to feel sleepy or confused, call 911. When should you call for help? Call 911 anytime you think you may need emergency care. For example, call if:    You passed out (lost consciousness). You are confused or cannot think clearly. Your blood sugar is very high or very low. Watch closely for changes in your health, and be sure to contact your doctor if:    Your blood sugar stays outside the level your doctor set for you. You have any problems. Where can you learn more? Go to http://www.gray.com/  Enter [id number] in the search box to learn more about \"Diabetic Ketoacidosis (DKA): Care Instructions. \"  Current as of: April 13, 2022               Content Version: 13.4  © 5072-6892 [employer]. Care instructions adapted under license by [employer] (which disclaims liability or warranty for this information). If you have questions about a medical condition or this instruction, always ask your healthcare professional. [place] [address] any warranty or liability for your use of this information. Learning About Coronavirus [phone])  What is coronavirus (COVID-19)? COVID-19 is a disease caused by a type of coronavirus. This illness was first found in December 2019. It has since spread worldwide. Coronaviruses are a large group of viruses. They cause the common cold.  They also cause more serious illnesses like [place] respiratory syndrome (MERS) and severe acute respiratory syndrome (SARS). COVID-19 is caused by a novel coronavirus. That means it's a new type that has not been seen in people before. What are the symptoms? COVID-19 symptoms may include:  Fever. Cough. Trouble breathing. Chills or repeated shaking with chills. Muscle and body aches. Headache. Sore throat. New loss of taste or smell. Vomiting. Diarrhea. In severe cases, COVID-19 can cause pneumonia and make it hard to breathe without help from a machine. It can cause death. How is it diagnosed? COVID-19 is diagnosed with a viral test. This may also be called a PCR test or antigen test. It looks for evidence of the virus in your breathing passages or lungs (respiratory system). The test is most often done on a sample from the nose, throat, or lungs. It's sometimes done on a sample of saliva. One way a sample is collected is by putting a long swab into the back of your nose. If you have questions about COVID-19 testing, ask your doctor or go to cdc.gov to use the COVID-19 Viral Testing Tool. How is it treated? Mild cases of COVID-19 can be treated at home. Serious cases need treatment in the hospital. Treatment may include medicines, plus breathing support such as oxygen therapy or a ventilator. Some people may be placed on their belly to help their oxygen levels. Treatments that may help people who have COVID-19 include:  Antiviral medicines. These medicines treat viral infections. Immune-based therapy. These medicines help the immune system fight COVID-19. Examples include monoclonal antibodies. Blood thinners. These medicines help prevent blood clots. People with severe illness are at risk for blood clots. How can you protect yourself and others? Stay up to date on your COVID-19 vaccines. Avoid sick people, and stay away from others if you are sick. Stay at least 6 feet away from other people. Avoid crowds, especially inside.   Get tested for COVID-19 before you have an indoor visit with people who don't live with you. Improve the airflow when you spend time indoors with people who don't live with you. If you can, open windows and doors. Or you can use a fan to blow air away from people and out a window. Cover your mouth with a tissue when you cough or sneeze. Wash your hands often, especially after you cough or sneeze. Use soap and water, and scrub for at least 20 seconds. If soap and water aren't available, use an alcohol-based hand . Avoid touching your mouth, nose, and eyes. Check the CDC website at cdc.gov for the most current information on how to protect yourself. And if you have questions, ask your doctor or go to cdc.gov to use the COVID-19 Quarantine and Isolation Calculator. Here are some other steps you may need to take. If you are not up to date on your COVID-19 vaccines:  Wear a mask with the best fit, protection, and comfort for you. A mask can protect you even when others aren't wearing one. This might be especially important if you:  Have certain health conditions. Live with someone who has a compromised immune system. Live with someone who is not up to date on their COVID-19 vaccines. If you have been exposed to the virus AND are not up to date on your COVID-19 vaccines:  Talk to your doctor as soon as you can. Your doctor might have you take medicine to help prevent serious illness. Get a COVID-19 test. You may need to be tested more than once. And if your test is positive, follow the instructions below. Stay home. Try to separate from other people where you live. Don't go to school, work, or public areas. Wear a well-fitting mask around other people for a full 10 days. Avoid travel, and stay away from people at high risk for serious illness. Watch for symptoms.   If you have been exposed AND either tested positive for COVID-19 in the last 90 days and have recovered or you are up to date on your COVID-19 vaccines:  Talk to your doctor as soon as you can. Your doctor may have you take medicine to help prevent serious illness. Get a COVID-19 test. Wait 5 days after you were last exposed. You may need to be tested more than once. And if your test is positive, follow the instructions below. Wear a well-fitting mask around other people for a full 10 days. Avoid travel and stay away from people at high risk for serious illness. Watch for symptoms. If you tested positive for COVID-19 in the last 90 days and have not recovered, another COVID-19 test may not be needed. If you're sick or test positive for COVID-19:  Talk to your doctor as soon as you can. Your doctor may have you take medicine to help prevent serious illness. Get a COVID-19 test unless you have already been tested. You may need to be tested more than once. Stay home. Leave only if you need to get medical care. If you were seriously ill or if you have a weakened immune system, you may need to isolate for several weeks. For a full 10 days, wear a well-fitting mask whenever you're around other people. Avoid travel and stay away from people at high risk for serious illness. Limit contact with pets and people in your home. If possible, stay in a separate bedroom and use a separate bathroom. Clean and disinfect your home every day. Use household  and disinfectant wipes or sprays. Take special care to clean things that you touch with your hands. How can you self-isolate when you have COVID-19? If you have COVID-19, there are things you can do to help avoid spreading the virus to others. Stay home, and avoid contact with other people. Limit contact with people in your home. If possible, stay in a separate bedroom and use a separate bathroom. Wear a well-fitting mask when you are around other people. Avoid contact with pets and other animals. Cover your mouth and nose with a tissue when you cough or sneeze. Then throw it in the trash right away.   Wash your hands often, especially after you cough or sneeze. Use soap and water, and scrub for at least 20 seconds. If soap and water aren't available, use an alcohol-based hand . Don't share personal household items. These include bedding, towels, cups and glasses, and eating utensils. [address] in the warmest water allowed for the fabric type, and dry it completely. It's okay to wash other people's laundry with yours. Clean and disinfect your home. Use household  and disinfectant wipes or sprays. If you have questions, visit cdc.gov to check the Quarantine and Isolation Calculator. When should you call for help? Call 911 anytime you think you may need emergency care. For example, call if you have life-threatening symptoms, such as: You have severe trouble breathing. (You can't talk at all.)     You have constant chest pain or pressure. You are severely dizzy or lightheaded. You are confused or can't think clearly. You have pale, gray, or blue-colored skin or lips. You pass out (lose consciousness) or are very hard to wake up. You have loss of balance or trouble walking. You have trouble seeing out of one or both eyes. You have weakness or drooping on one side of the face. You have weakness or numbness in an arm or a leg. You have trouble speaking. You have a severe headache. You have a seizure. Call your doctor now or seek immediate medical care if:    You have moderate trouble breathing. (You can't speak a full sentence.)     You are coughing up blood. You have signs of low blood pressure. These include feeling lightheaded; being too weak to stand; and having cold, pale, clammy skin. Watch closely for changes in your health, and be sure to contact your doctor if:    Your symptoms get worse. You are not getting better as expected. You have new or worse symptoms of anxiety, depression, nightmares, or flashbacks.    Call before you go to the doctor's office. Follow their instructions. And wear a mask. Where can you learn more? Go to http://www.gray.com/  Enter C008 in the search box to learn more about \"Learning About Coronavirus (COVID-19). \"  Current as of: July 28, 2022               Content Version: 13.4  © 3906-4136 [employer]. Care instructions adapted under license by [employer] (which disclaims liability or warranty for this information). If you have questions about a medical condition or this instruction, always ask your healthcare professional. [place] [address] any warranty or liability for your use of this information. Supraventricular Tachycardia: Care Instructions  Overview     Having supraventricular tachycardia (SVT) means that sometimes your heart beats abnormally fast. This fast rhythm is caused by changes in the electrical system of your heart. You may feel a fluttering in your chest (palpitations) and have a fast pulse. When your heart is beating fast, you may feel anxious and lightheaded, be short of breath, and feel discomfort in the chest.  Your doctor may prescribe medicines to help slow down your heartbeat. Your doctor may also suggest you try vagal maneuvers to help slow your heart rate. Your doctor can show you how to do them. In some cases, either cardioversion treatment or a procedure called catheter ablation is done to correct SVT. Your doctor may ask you to wear a small electronic device for 1 or 2 days to monitor your heart. It is called a Holter monitor. Follow-up care is a key part of your treatment and safety. Be sure to make and go to all appointments, and call your doctor if you are having problems. It's also a good idea to know your test results and keep a list of the medicines you take. How can you care for yourself at home? Be safe with medicines. Take your medicines exactly as prescribed.  Call your doctor if you think you are having a problem with your medicine. You will get more details on the specific medicines your doctor prescribes. If your doctor showed you how to do vagal maneuvers, try them when you have an episode. These maneuvers include bearing down or putting an ice-cold, wet towel on your face. Monitor your condition by keeping a diary of your SVT episodes. Bring this to your doctor appointments. Write down how fast or slow your heart was beating. To count your heart rate:  Gently place 2 fingers of your hand on the inside of your other wrist, below your thumb. Count the beats for 30 seconds. Then, double the result to get the number of beats per minute. Write down if your heart rhythm was regular or irregular. Write down the symptoms you had. Write down the time of day your symptoms occurred. Write down how long your symptoms lasted. Write down what you were doing when your symptoms started. Write down what may have helped your symptoms go away. If they trigger episodes, limit or avoid alcohol or drinks with caffeine. Do not use over-the-counter decongestants, herbal remedies, diet pills, or \"pep\" pills, which often contain stimulants. Do not use illegal drugs, such as cocaine, ecstasy, or methamphetamine, which can speed up your heart's rhythm. Do not smoke. Smoking can make this condition worse. If you need help quitting, talk to your doctor about stop-smoking programs and medicines. These can increase your chances of quitting for good. Be alert for new or worsening symptoms, such as shortness of breath, pounding of your heart, or unusual tiredness. If new symptoms develop or your symptoms become worse, call your doctor. When should you call for help? Call 911 anytime you think you may need emergency care. For example, call if:    You passed out (lost consciousness). You are short of breath. Call your doctor now or seek immediate medical care if:    You have a fast heartbeat.      You are dizzy or lightheaded, or feel like you may faint. Watch closely for changes in your health, and be sure to contact your doctor if:    You do not get better as expected. Where can you learn more? Go to http://www.gray.com/  Enter G244 in the search box to learn more about \"Supraventricular Tachycardia: Care Instructions. \"  Current as of: January 10, 2022               Content Version: 13.4  © 2006-2022 [employer]. Care instructions adapted under license by [employer] (which disclaims liability or warranty for this information). If you have questions about a medical condition or this instruction, always ask your healthcare professional. [address] any warranty or liability for your use of this information.

## 2022-12-10 NOTE — DISCHARGE SUMMARY
[facility]ardo[facility] [address]  [address] [facility], [address] [place] [address]  [phone]    Hospitalist Discharge Summary     Patient ID:  Janak Dozier  [id number]  84 y.o.  1970    Admit date: 12/5/2022    Discharge date and time: 12/10/2022 11:12 AM    Admission Diagnoses: DKA (diabetic ketoacidosis) ([facility] [address] [E11.10]    Discharge Diagnoses:  Principal Diagnosis DKA (diabetic ketoacidosis) ([address]                                            Principal Problem:    DKA (diabetic ketoacidosis) ([facility] [address] (12/5/2022)    Active Problems:    Pneumonia due to COVID-19 virus (12/10/2022)      SVT (supraventricular tachycardia) ([facility] [address] (12/10/2022)           Hospital Course:     47 yo hx of HTN, DM type 1, presented w/ DKA, SVT, COVID infection     1) DM type 1 w/ DKA, uncontrolled: A1C 9.9%. DKA resolved, likely triggered by COVID. Cont home Lantus, SSI     2) COVID-19 infection/pneumonia: has elevated inflammatory labs on admission. Not hypoxic. Will cont dexamethasone, Vit C/Zinc     3) SVT: due to COVID. Cont oral dilt. Cards following     4) HTN: now improving. Will cont oral labetalol, dilt, lisinopril.   Stopped norvasc      5) Weakness: PT/OT following, recommended HH     6) HypoK/phos: repleted IV    PCP: None     Consults: Cardiology    Significant Diagnostic Studies: none    Discharge Exam:  Physical Exam:    Gen: Well-developed, well-nourished, in no acute distress  HEENT:  Pink conjunctivae, PERRL, hearing intact to voice, moist mucous membranes  Neck: Supple, without masses, thyroid non-tender  Resp: No accessory muscle use, clear breath sounds without wheezes rales or rhonchi  Card: No murmurs, normal S1, S2 without thrills, bruits or peripheral edema  Abd:  Soft, non-tender, non-distended, normoactive bowel sounds are present  Lymph:  No cervical or inguinal adenopathy  Musc: No cyanosis or clubbing  Skin: No rashes   Neuro:  Cranial nerves are grossly intact, no focal motor weakness, follows commands appropriately  Psych:  Good insight, oriented to person, place and time, alert    Disposition: home  Discharge Condition: Stable    Patient Instructions:   Current Discharge Medication List        START taking these medications    Details   ascorbic acid, vitamin C, (VITAMIN C) 500 mg tablet Take 1 Tablet by mouth daily. Qty: 30 Tablet, Refills: 0      benzonatate (TESSALON) 100 mg capsule Take 1 Capsule by mouth three (3) times daily as needed for Cough. Qty: 30 Capsule, Refills: 0      dilTIAZem ER (CARDIZEM CD) 120 mg capsule Take 1 Capsule by mouth daily. Qty: 30 Capsule, Refills: 0      guaiFENesin ER (MUCINEX) 600 mg ER tablet Take 1 Tablet by mouth every twelve (12) hours. Qty: 30 Tablet, Refills: 0      labetaloL (NORMODYNE) 100 mg tablet Take 1 Tablet by mouth every twelve (12) hours. Qty: 60 Tablet, Refills: 0      lisinopriL (PRINIVIL, ZESTRIL) 5 mg tablet Take 1 Tablet by mouth daily. Qty: 30 Tablet, Refills: 0      magic mouthwash (FIRST-MOUTHWASH BLM) -227-40 mg/30mL mwsh oral suspension Take 10 mL by mouth every six (6) hours. Qty: 237 mL, Refills: 0      zinc sulfate (ZINCATE) 50 mg zinc (220 mg) capsule Take 1 Capsule by mouth daily. Qty: 30 Capsule, Refills: 0      dexAMETHasone (DECADRON) 6 mg tablet Take 1 Tablet by mouth Daily (before breakfast) for 3 days. Qty: 3 Tablet, Refills: 0      albuterol (PROVENTIL HFA, VENTOLIN HFA, PROAIR HFA) 90 mcg/actuation inhaler Take 2 Puffs by inhalation every six (6) hours as needed for Wheezing, Shortness of Breath or Respiratory Distress. Qty: 18 g, Refills: 0           CONTINUE these medications which have NOT CHANGED    Details   metFORMIN (GLUMETZA) 1,000 mg TG24 24 hour tablet Take 1,000 mg by mouth daily (with dinner). empagliflozin (Jardiance) 25 mg tablet Take 12.5 mg by mouth daily. insulin aspart U-100 (NOVOLOG) 100 unit/mL (3 mL) inpn 20-25 Units by SubCUTAneous route Before breakfast, lunch, and dinner. insulin glargine (Lantus Solostar U-100 Insulin) 100 unit/mL (3 mL) inpn 50 Units by SubCUTAneous route nightly. semaglutide (Ozempic) 1 mg/dose (2 mg/1.5 mL) sub-q pen 1 mg by SubCUTAneous route every Tuesday. simvastatin (Zocor) 40 mg tablet Take 20 mg by mouth nightly.            STOP taking these medications       amLODIPine (NORVASC) 10 mg tablet Comments:   Reason for Stopping:             Activity: Activity as tolerated  Diet: Diabetic Diet  Wound Care: None needed    Follow-up with  Follow-up Information       Follow up With Specialties Details Why Contact Info    [facility]  Schedule an appointment as soon as possible for a visit in 1 week(s)  [address] [place] [address]  [phone]             Follow-up tests/labs none    Signed:  Kamla Bah MD  12/10/2022  11:12 AM  **I personally spent 35 min on discharge**

## 2022-12-11 LAB
BACTERIA SPEC CULT: NORMAL
BACTERIA SPEC CULT: NORMAL
CALCULATED R AXIS, ECG10: -58 DEGREES
CALCULATED T AXIS, ECG11: 84 DEGREES
DIAGNOSIS, 93000: NORMAL
Q-T INTERVAL, ECG07: 282 MS
QRS DURATION, ECG06: 72 MS
QTC CALCULATION (BEZET), ECG08: 433 MS
SERVICE CMNT-IMP: NORMAL
SERVICE CMNT-IMP: NORMAL
VENTRICULAR RATE, ECG03: 142 BPM